# Patient Record
Sex: FEMALE | Race: BLACK OR AFRICAN AMERICAN | NOT HISPANIC OR LATINO | Employment: PART TIME | URBAN - METROPOLITAN AREA
[De-identification: names, ages, dates, MRNs, and addresses within clinical notes are randomized per-mention and may not be internally consistent; named-entity substitution may affect disease eponyms.]

---

## 2017-01-11 ENCOUNTER — OFFICE VISIT (OUTPATIENT)
Dept: OBSTETRICS AND GYNECOLOGY | Facility: CLINIC | Age: 37
End: 2017-01-11
Payer: OTHER GOVERNMENT

## 2017-01-11 VITALS
DIASTOLIC BLOOD PRESSURE: 80 MMHG | HEIGHT: 66 IN | SYSTOLIC BLOOD PRESSURE: 120 MMHG | WEIGHT: 153.69 LBS | BODY MASS INDEX: 24.7 KG/M2

## 2017-01-11 DIAGNOSIS — Z01.419 WELL WOMAN EXAM WITH ROUTINE GYNECOLOGICAL EXAM: Primary | ICD-10-CM

## 2017-01-11 DIAGNOSIS — N89.8 VAGINAL ITCHING: ICD-10-CM

## 2017-01-11 DIAGNOSIS — Z01.419 PAP SMEAR, AS PART OF ROUTINE GYNECOLOGICAL EXAMINATION: ICD-10-CM

## 2017-01-11 PROCEDURE — 99999 PR PBB SHADOW E&M-EST. PATIENT-LVL II: CPT | Mod: PBBFAC,,, | Performed by: OBSTETRICS & GYNECOLOGY

## 2017-01-11 PROCEDURE — 87210 SMEAR WET MOUNT SALINE/INK: CPT | Mod: PBBFAC | Performed by: OBSTETRICS & GYNECOLOGY

## 2017-01-11 PROCEDURE — 99212 OFFICE O/P EST SF 10 MIN: CPT | Mod: PBBFAC | Performed by: OBSTETRICS & GYNECOLOGY

## 2017-01-11 PROCEDURE — 99395 PREV VISIT EST AGE 18-39: CPT | Mod: 25,S$PBB,, | Performed by: OBSTETRICS & GYNECOLOGY

## 2017-01-11 PROCEDURE — 88175 CYTOPATH C/V AUTO FLUID REDO: CPT

## 2017-01-11 NOTE — PROGRESS NOTES
Subjective:       Patient ID: Haley Hammer is a 36 y.o. female.    Chief Complaint:  Annual Exam      History of Present Illness  HPI  Presents for a well-woman exam.  Last pap in  was ASCUS, high risk HPV negative.  Patient and her  use condoms for contraception.  Declines any other method.  She reports occasional vaginal itching.  No discharge.    GYN & OB History  Patient's last menstrual period was 2016.   Date of Last Pap: 2014    OB History    Para Term  AB SAB TAB Ectopic Multiple Living   3 3 3      0 3      # Outcome Date GA Lbr Yannick/2nd Weight Sex Delivery Anes PTL Lv   3 Term 11/10/14 39w1d  3.189 kg (7 lb 0.5 oz) F CS-LTranv Spinal N Y   2 Term 13 40w0d  4.082 kg (9 lb) M CS-Unspec Spinal N Y   1 Term 04/18/10 40w0d  4.536 kg (10 lb) F CS-Unspec Spinal N Y          Review of Systems  Review of Systems   Constitutional: Negative for activity change, fatigue, fever and unexpected weight change.   Gastrointestinal: Negative for abdominal pain, bloating, constipation, diarrhea, nausea and vomiting.   Endocrine: Negative for hair loss and hot flashes.   Genitourinary: Negative for dyspareunia, dysuria, frequency, genital sores, hematuria, menorrhagia, menstrual problem, pelvic pain, urgency, vaginal bleeding, vaginal discharge, vaginal pain (has vaginal itching), dysmenorrhea, postcoital bleeding and vaginal odor.   Skin:  Negative for rash and hair changes.   Hematological: Negative for adenopathy.   Breast: Negative for breast mass, breast pain, nipple discharge and skin changes          Objective:    Physical Exam:   Constitutional: She is oriented to person, place, and time. She appears well-developed and well-nourished. No distress.      Neck: Neck supple. No thyromegaly present.     Pulmonary/Chest: Right breast exhibits no inverted nipple, no mass, no nipple discharge, no skin change, no tenderness, no bleeding and no swelling. Left breast exhibits  no inverted nipple, no mass, no nipple discharge, no skin change, no tenderness, no bleeding and no swelling. Breasts are symmetrical.        Abdominal: Soft. She exhibits no distension and no mass. There is no tenderness. There is no rebound and no guarding.     Genitourinary: Pelvic exam was performed with patient supine. There is no rash, tenderness, lesion or injury on the right labia. There is no rash, tenderness, lesion or injury on the left labia. Uterus is not deviated, not enlarged, not fixed, not tender, not hosting fibroids and not experiencing uterine prolapse. Cervix is normal. Right adnexum displays no mass, no tenderness and no fullness. Left adnexum displays no mass, no tenderness and no fullness. No erythema, tenderness, bleeding, rectocele or cystocele in the vagina. No foreign body in the vagina. No signs of injury around the vagina. No vaginal discharge found. Cervix exhibits no motion tenderness, no discharge and no friability.        Uterus Size: 6 cm      Lymphadenopathy:        Right: No inguinal adenopathy present.        Left: No inguinal adenopathy present.    Neurological: She is alert and oriented to person, place, and time.     Psychiatric: She has a normal mood and affect.        wet prep: normal  Assessment:        1. Well woman exam with routine gynecological exam    2. Pap smear, as part of routine gynecological examination    3. Vaginal itching                Plan:      Haley was seen today for annual exam.    Diagnoses and all orders for this visit:    Well woman exam with routine gynecological exam    Pap smear, as part of routine gynecological examination  -     Liquid-based pap smear, screening    Vaginal itching     Reviewed updated recommendations for pap smears (every 3 years) in low risk patients.   Recommend annual pelvic exams.  Reviewed recommendations for annual CBE.  Patient was counseled today on vaginitis prevention including :  a. avoiding feminine products such  as deoderant soaps, body wash, bubble bath, douches, scented toilet paper, deoderant tampons or pads, feminine wipes, chronic pad use, etc.  b. avoiding other vulvovaginal irritants such as long hot baths, humidity, tight, synthetic clothing, chlorine and sitting around in wet bathing suits  c. wearing cotton underwear, avoiding thong underwear and no underwear to bed  d. taking showers instead of baths and use a hair dryer on cool setting afterwards to dry  e. wearing cotton to exercise and shower immediately after exercise and change clothes  RTC 1 year or sooner prn.

## 2017-04-24 ENCOUNTER — CLINICAL SUPPORT (OUTPATIENT)
Dept: CARDIOLOGY | Facility: CLINIC | Age: 37
End: 2017-04-24
Payer: OTHER GOVERNMENT

## 2017-04-24 ENCOUNTER — OFFICE VISIT (OUTPATIENT)
Dept: INTERNAL MEDICINE | Facility: CLINIC | Age: 37
End: 2017-04-24
Payer: OTHER GOVERNMENT

## 2017-04-24 ENCOUNTER — HOSPITAL ENCOUNTER (OUTPATIENT)
Dept: RADIOLOGY | Facility: HOSPITAL | Age: 37
Discharge: HOME | End: 2017-04-24
Attending: FAMILY MEDICINE
Payer: OTHER GOVERNMENT

## 2017-04-24 VITALS
BODY MASS INDEX: 25.19 KG/M2 | WEIGHT: 156.75 LBS | DIASTOLIC BLOOD PRESSURE: 80 MMHG | SYSTOLIC BLOOD PRESSURE: 138 MMHG | TEMPERATURE: 97 F | HEIGHT: 66 IN | OXYGEN SATURATION: 99 % | HEART RATE: 56 BPM

## 2017-04-24 DIAGNOSIS — R07.89 ATYPICAL CHEST PAIN: ICD-10-CM

## 2017-04-24 DIAGNOSIS — Z13.6 SCREENING FOR CARDIOVASCULAR CONDITION: ICD-10-CM

## 2017-04-24 DIAGNOSIS — M47.817 LUMBOSACRAL SPONDYLOSIS WITHOUT MYELOPATHY: ICD-10-CM

## 2017-04-24 DIAGNOSIS — R10.13 EPIGASTRIC ABDOMINAL PAIN: Primary | ICD-10-CM

## 2017-04-24 DIAGNOSIS — R10.13 EPIGASTRIC ABDOMINAL PAIN: ICD-10-CM

## 2017-04-24 DIAGNOSIS — M79.10 MYALGIA: ICD-10-CM

## 2017-04-24 DIAGNOSIS — D57.3 SICKLE CELL TRAIT: ICD-10-CM

## 2017-04-24 PROCEDURE — 71020 XR CHEST PA AND LATERAL: CPT | Mod: 26,,, | Performed by: RADIOLOGY

## 2017-04-24 PROCEDURE — 93005 ELECTROCARDIOGRAM TRACING: CPT | Mod: PBBFAC,PO | Performed by: NUCLEAR MEDICINE

## 2017-04-24 PROCEDURE — 99214 OFFICE O/P EST MOD 30 MIN: CPT | Mod: S$PBB,,, | Performed by: FAMILY MEDICINE

## 2017-04-24 PROCEDURE — 74020 XR ABDOMEN FLAT AND ERECT: CPT | Mod: TC,PO

## 2017-04-24 PROCEDURE — 74020 XR ABDOMEN FLAT AND ERECT: CPT | Mod: 26,,, | Performed by: RADIOLOGY

## 2017-04-24 PROCEDURE — 93010 ELECTROCARDIOGRAM REPORT: CPT | Mod: S$PBB,,, | Performed by: NUCLEAR MEDICINE

## 2017-04-24 PROCEDURE — 71020 XR CHEST PA AND LATERAL: CPT | Mod: TC,PO

## 2017-04-24 PROCEDURE — 99213 OFFICE O/P EST LOW 20 MIN: CPT | Mod: PBBFAC,PO | Performed by: FAMILY MEDICINE

## 2017-04-24 PROCEDURE — 99999 PR PBB SHADOW E&M-EST. PATIENT-LVL III: CPT | Mod: PBBFAC,,, | Performed by: FAMILY MEDICINE

## 2017-04-24 RX ORDER — OMEPRAZOLE 40 MG/1
40 CAPSULE, DELAYED RELEASE ORAL DAILY
Qty: 30 CAPSULE | Refills: 0 | Status: SHIPPED | OUTPATIENT
Start: 2017-04-24 | End: 2018-09-26

## 2017-04-24 NOTE — MR AVS SNAPSHOT
[facility] Medicine  [address] [facility] [address] [place] [address]  Phone: [phone]  Fax: [phone]                  Haley Hammer   2017 1:20 PM   Office Visit    Description:  Female : 1980   Provider:  Kell Lamar MD   Department:  [facility] - Internal Medicine           Reason for Visit     Abdominal Pain           Diagnoses this Visit        Comments    Epigastric abdominal pain    -  Primary     Myalgia         Atypical chest pain         Lumbosacral spondylosis without myelopathy         Sickle cell trait         Screening for cardiovascular condition                To Do List           Future Appointments        Provider Department Dept Phone    2017 2:[phone]    2017 3:15 PM LAB[facility]ATORY, [facility] [phone]    2017 3:40 PM SPECIMEN, [facility] [phone]    2017 4:00 PM [facility] [phone]      Goals (5 Years of Data)     None      Follow-Up and Disposition     Return if symptoms worsen or fail to improve.       These Medications        Disp Refills Start End    omeprazole (PRILOSEC) 40 MG capsule 30 capsule 0 2017    Take 1 capsule (40 mg total) by mouth once daily. - Oral    Pharmacy: [facility] [address] [place], [place] [address]ON [facility] [address] AT [facility] [place] Ph #: [phone]         [facility]     [facility] Nurse Care Line -  Assistance  Unless otherwise directed by your provider, please contact [facility], our nurse care line that is available for  assistance.     Registered nurses in the [facility] provide: appointment scheduling, clinical advisement, health education, and other advisory services.  Call: [phone] (toll free)               Medications           Message regarding Medications     Verify the changes and/or additions  "to your medication regime listed below are the same as discussed with your clinician today.  If any of these changes or additions are incorrect, please notify your healthcare provider.        START taking these NEW medications        Refills    omeprazole (PRILOSEC) 40 MG capsule 0    Sig: Take 1 capsule (40 mg total) by mouth once daily.    Class: Normal    Route: Oral           Verify that the below list of medications is an accurate representation of the medications you are currently taking.  If none reported, the list may be blank. If incorrect, please contact your healthcare provider. Carry this list with you in case of emergency.           Current Medications     omeprazole (PRILOSEC) 40 MG capsule Take 1 capsule (40 mg total) by mouth once daily.           Clinical Reference Information           Your Vitals Were     BP Pulse Temp Height Weight SpO2    138/80 56 97.2 °F (36.2 °C) (Tympanic) 5' 6" (1.676 m) 71.1 kg (156 lb 12 oz) 99%    BMI                25.3 kg/m2          Blood Pressure          Most Recent Value    BP  138/80      Allergies as of 4/24/2017     No Known Allergies      Immunizations Administered on Date of Encounter - 4/24/2017     None      Orders Placed During Today's Visit      Normal Orders This Visit    X-Ray Chest PA And Lateral     Future Labs/Procedures Expected by Expires    CBC auto differential  4/24/2017 6/23/2018    Comprehensive metabolic panel  4/24/2017 6/23/2018    H. PYLORI ANTIBODY, IGG  4/24/2017 6/23/2018    Lipid panel  4/24/2017 6/23/2018    Troponin I  4/24/2017 6/23/2018    TSH  4/24/2017 6/23/2018    Urinalysis  4/24/2017 6/23/2018    X-Ray Abdomen Flat And Erect  4/24/2017 4/24/2018    EKG 12-lead  As directed 4/24/2018      Language Assistance Services     ATTENTION: Language assistance services are available, free of charge. Please call [phone].      ATENCIÓN: Si habla español, tiene a arevalo disposición servicios gratuitos de asistencia lingüística. Llame al " [phone].     CHRISTOS Ý: N?u b?n nói Ti?ng Vi?t, có các d?ch v? h? tr? ngôn ng? mi?n phí dành cho b?n. G?i s? [phone].         [facility] - Internal [facility] complies with applicable Federal civil rights laws and does not discriminate on the basis of race, color, national origin, age, disability, or sex.

## 2017-04-24 NOTE — PROGRESS NOTES
"Patient is inSubjective:       Patient ID: Haley Hammer is a 36 y.o. female.    Chief Complaint: Abdominal Pain    HPI Comments: 36-year-old female patient with Patient Active Problem List:     Sickle cell trait     Lumbosacral spondylosis without myelopathy  Here with complaint of epigastric abdominal pain, but denies of any nausea vomiting, patient reports having bowel movement once every 2 days, previous bowel movement was 2 days ago.  Patient has been having shoulder muscle pains and right chest pain, off and on lately for the past 3-4 days.  Denies of any fever.  Patient reports feels like acid reflex symptoms but not for sure.   Denies of any shortness of breath  Denies of any significant low back pain at this time  Denies of any changes in urine.         Abdominal Pain   Associated symptoms include constipation and myalgias. Pertinent negatives include no arthralgias, diarrhea, dysuria, frequency, headaches, nausea or vomiting.     Review of Systems   Constitutional: Negative for fatigue.   Eyes: Negative for visual disturbance.   Respiratory: Negative for shortness of breath.    Cardiovascular: Positive for chest pain. Negative for leg swelling.   Gastrointestinal: Positive for abdominal pain and constipation. Negative for diarrhea, nausea and vomiting.   Genitourinary: Negative for dysuria, flank pain, frequency and urgency.   Musculoskeletal: Positive for myalgias. Negative for arthralgias.   Skin: Negative for rash.   Neurological: Negative for light-headedness and headaches.   Psychiatric/Behavioral: Negative for sleep disturbance.         /80  Pulse (!) 56  Temp 97.2 °F (36.2 °C) (Tympanic)   Ht 5' 6" (1.676 m)  Wt 71.1 kg (156 lb 12 oz)  SpO2 99%  Breastfeeding? No  BMI 25.3 kg/m2  Objective:      Physical Exam   Constitutional: She is oriented to person, place, and time. She appears well-developed and well-nourished.   HENT:   Head: Normocephalic and atraumatic.   Mouth/Throat: " Oropharynx is clear and moist.   Cardiovascular: Normal rate, regular rhythm and normal heart sounds.    No murmur heard.  Pulmonary/Chest: Effort normal and breath sounds normal. She has no wheezes. She exhibits no tenderness.   Abdominal: Soft. Bowel sounds are normal. There is tenderness.   Positive for epigastric abdominal tenderness on exam today   Musculoskeletal: She exhibits tenderness. She exhibits no edema.   Positive for bilateral shoulder muscles posteriorly   Neurological: She is alert and oriented to person, place, and time.   Skin: Skin is warm and dry. No rash noted.   Psychiatric: She has a normal mood and affect.         Assessment:       1. Epigastric abdominal pain    2. Myalgia    3. Atypical chest pain    4. Lumbosacral spondylosis without myelopathy    5. Sickle cell trait    6. Screening for cardiovascular condition        Plan:   Epigastric abdominal pain  -     CBC auto differential; Future; Expected date: 4/24/17  -     Comprehensive metabolic panel; Future; Expected date: 4/24/17  -     TSH; Future; Expected date: 4/24/17  -     Urinalysis; Future; Expected date: 4/24/17  -     X-Ray Abdomen Flat And Erect; Future; Expected date: 4/24/17  -     H. PYLORI ANTIBODY, IGG; Future; Expected date: 4/24/17  -     omeprazole (PRILOSEC) 40 MG capsule; Take 1 capsule (40 mg total) by mouth once daily.  Dispense: 30 capsule; Refill: 0  Could be secondary to acid reflex versus organic etiology.  Will check further labs ,omeprazole prescribed today for symptomatic relief as a trial  Patient was advised to drink adequate fluids and eat small frequent meals  Eat fiber rich diet to avoid constipation    if symptoms continue to persist or worsen return to the clinic or go to ER immediately    Myalgia  -     Comprehensive metabolic panel; Future; Expected date: 4/24/17  -     TSH; Future; Expected date: 4/24/17  -     X-Ray Chest PA And Lateral  -     Troponin I; Future; Expected date: 4/24/17    Atypical  chest pain  -     Troponin I; Future; Expected date: 4/24/17  -     EKG 12-lead; Future   likely musculoskeletal, will check further labs and rule out cardiac etiology    Lumbosacral spondylosis without myelopathy-stable     Sickle cell trait-stable     Screening for cardiovascular condition  -     Lipid panel; Future; Expected date: 4/24/17

## 2017-04-26 ENCOUNTER — TELEPHONE (OUTPATIENT)
Dept: INTERNAL MEDICINE | Facility: CLINIC | Age: 37
End: 2017-04-26

## 2017-04-26 DIAGNOSIS — D50.9 IRON DEFICIENCY ANEMIA, UNSPECIFIED IRON DEFICIENCY ANEMIA TYPE: ICD-10-CM

## 2017-04-26 DIAGNOSIS — R76.8 HELICOBACTER PYLORI AB+: Primary | ICD-10-CM

## 2017-04-26 RX ORDER — AMOXICILLIN 500 MG/1
1000 CAPSULE ORAL EVERY 12 HOURS
Qty: 40 CAPSULE | Refills: 0 | Status: SHIPPED | OUTPATIENT
Start: 2017-04-26 | End: 2018-09-26 | Stop reason: ALTCHOICE

## 2017-04-26 RX ORDER — CLARITHROMYCIN 500 MG/1
500 TABLET, FILM COATED ORAL EVERY 12 HOURS
Qty: 20 TABLET | Refills: 0 | Status: SHIPPED | OUTPATIENT
Start: 2017-04-26 | End: 2018-09-26 | Stop reason: ALTCHOICE

## 2017-04-26 RX ORDER — FERROUS SULFATE 325(65) MG
325 TABLET ORAL 2 TIMES DAILY
Qty: 60 TABLET | Refills: 3 | Status: SHIPPED | OUTPATIENT
Start: 2017-04-26 | End: 2019-01-21

## 2017-04-26 NOTE — TELEPHONE ENCOUNTER
Positive for Helicobacter pylori, 2 antibiotics has been sent to pharmacy amoxicillin and clarithromycin, patient to continue omeprazole as prescribed.   X-ray of the abdomen showed no acute findings  Chest x-ray showing  No  Acute changes at this time   EKG showing low heart rate  Also noted mild anemia, iron supplements will be sent to pharmacy

## 2017-10-06 ENCOUNTER — TELEPHONE (OUTPATIENT)
Dept: PHYSICAL MEDICINE AND REHAB | Facility: CLINIC | Age: 37
End: 2017-10-06

## 2017-10-06 NOTE — TELEPHONE ENCOUNTER
----- Message from Gaetano Fraire sent at 10/6/2017 10:12 AM CDT -----  Contact: [phone]  Would like to consult with nurse about medication refill for leg pain. Pt having pain right leg. Please call bk at [phone]. Changing pharmacy please call for info.    thx lj

## 2018-09-26 ENCOUNTER — OFFICE VISIT (OUTPATIENT)
Dept: OBSTETRICS AND GYNECOLOGY | Facility: CLINIC | Age: 38
End: 2018-09-26
Payer: OTHER GOVERNMENT

## 2018-09-26 ENCOUNTER — LAB VISIT (OUTPATIENT)
Dept: LAB | Facility: HOSPITAL | Age: 38
End: 2018-09-26
Attending: NURSE PRACTITIONER
Payer: OTHER GOVERNMENT

## 2018-09-26 VITALS
WEIGHT: 143.06 LBS | SYSTOLIC BLOOD PRESSURE: 140 MMHG | BODY MASS INDEX: 22.99 KG/M2 | HEIGHT: 66 IN | DIASTOLIC BLOOD PRESSURE: 100 MMHG

## 2018-09-26 DIAGNOSIS — Z11.3 SCREENING EXAMINATION FOR STD (SEXUALLY TRANSMITTED DISEASE): Primary | ICD-10-CM

## 2018-09-26 DIAGNOSIS — Z11.3 SCREENING EXAMINATION FOR STD (SEXUALLY TRANSMITTED DISEASE): ICD-10-CM

## 2018-09-26 PROCEDURE — 99213 OFFICE O/P EST LOW 20 MIN: CPT | Mod: PBBFAC | Performed by: NURSE PRACTITIONER

## 2018-09-26 PROCEDURE — 99999 PR PBB SHADOW E&M-EST. PATIENT-LVL III: CPT | Mod: PBBFAC,,, | Performed by: NURSE PRACTITIONER

## 2018-09-26 PROCEDURE — 99213 OFFICE O/P EST LOW 20 MIN: CPT | Mod: S$PBB,,, | Performed by: NURSE PRACTITIONER

## 2018-09-26 PROCEDURE — 87491 CHLMYD TRACH DNA AMP PROBE: CPT

## 2018-09-26 PROCEDURE — 86703 HIV-1/HIV-2 1 RESULT ANTBDY: CPT

## 2018-09-26 PROCEDURE — 36415 COLL VENOUS BLD VENIPUNCTURE: CPT

## 2018-09-26 PROCEDURE — 86592 SYPHILIS TEST NON-TREP QUAL: CPT

## 2018-09-26 PROCEDURE — 87660 TRICHOMONAS VAGIN DIR PROBE: CPT

## 2018-09-26 RX ORDER — TRIAMCINOLONE ACETONIDE 1 MG/G
CREAM TOPICAL 2 TIMES DAILY
Qty: 15 G | Refills: 0 | Status: SHIPPED | OUTPATIENT
Start: 2018-09-26 | End: 2019-01-21

## 2018-09-26 RX ORDER — FLUCONAZOLE 150 MG/1
150 TABLET ORAL DAILY
Qty: 2 TABLET | Refills: 0 | Status: SHIPPED | OUTPATIENT
Start: 2018-09-26 | End: 2018-09-28

## 2018-09-26 NOTE — PROGRESS NOTES
"CC: Possible STD exposure     Haley Hammer is a 37 y.o. female  presents for concerns of STD exposure.  LMP: Patient's last menstrual period was 2018 (exact date)..  Patient reports vaginal itching.     Past Medical History:   Diagnosis Date    Abnormal Pap smear of vagina     Anemia     Back pain in pregnancy     Rh negative state in antepartum period-  Pt is AB - 2014    Sickle cell trait      does not carry sickle cell trait     Past Surgical History:   Procedure Laterality Date     SECTION      x3    DELIVERY-CEASAREAN SECTION N/A 11/10/2014    Performed by Angely Way MD at [facility] L[facility]D     Social History     Socioeconomic History    Marital status:      Spouse name: Not on file    Number of children: Not on file    Years of education: Not on file    Highest education level: Not on file   Social Needs    Financial resource strain: Not on file    Food insecurity - worry: Not on file    Food insecurity - inability: Not on file    Transportation needs - medical: Not on file    Transportation needs - non-medical: Not on file   Occupational History    Not on file   Tobacco Use    Smoking status: Never Smoker    Smokeless tobacco: Never Used   Substance and Sexual Activity    Alcohol use: No    Drug use: No    Sexual activity: Yes     Partners: Male     Birth control/protection: Condom   Other Topics Concern    Are you pregnant or think you may be? No    Breast-feeding No   Social History Narrative    Not on file     Family History   Problem Relation Age of Onset    Breast cancer Neg Hx     Ovarian cancer Neg Hx     Deep vein thrombosis Neg Hx     Colon cancer Neg Hx     Eczema Neg Hx     Lupus Neg Hx     Psoriasis Neg Hx      OB History      Para Term  AB Living    3 3 3     3    SAB TAB Ectopic Multiple Live Births          0 3          BP (!) 140/100   Ht 5' 6" (1.676 m)   Wt 64.9 kg (143 lb 1.3 oz)   LMP 2018 " (Exact Date)   BMI 23.09 kg/m²       ROS:  GENERAL: Denies weight gain or weight loss. Feeling well overall.   CARDIOVASCULAR: Denies palpitations or left sided chest pain.   ABDOMEN: No abdominal pain, constipation, diarrhea, nausea, vomiting or rectal bleeding.   URINARY: No frequency, dysuria, hematuria, or burning on urination.  REPRODUCTIVE: See HPI.       PHYSICAL EXAM:  APPEARANCE: Well nourished, well developed, in no acute distress.  AFFECT: WNL, alert and oriented x 3  PELVIC: Normal external genitalia without lesions. Vagina moist and well rugated without lesions or discharge.  Cervix pink, without lesions, discharge or tenderness.      1. Screening examination for STD (sexually transmitted disease)  HIV-1 and HIV-2 antibodies    RPR    C. trachomatis/N. gonorrhoeae by AMP DNA    Vaginosis Screen by DNA Probe    PLAN:  STD assessment

## 2018-09-27 LAB
C TRACH DNA SPEC QL NAA+PROBE: NOT DETECTED
CANDIDA RRNA VAG QL PROBE: NEGATIVE
G VAGINALIS RRNA GENITAL QL PROBE: POSITIVE
HIV 1+2 AB+HIV1 P24 AG SERPL QL IA: NEGATIVE
N GONORRHOEA DNA SPEC QL NAA+PROBE: NOT DETECTED
RPR SER QL: NORMAL
T VAGINALIS RRNA GENITAL QL PROBE: NEGATIVE

## 2018-09-27 RX ORDER — METRONIDAZOLE 500 MG/1
500 TABLET ORAL 2 TIMES DAILY
Qty: 14 TABLET | Refills: 0 | Status: SHIPPED | OUTPATIENT
Start: 2018-09-27 | End: 2018-10-04

## 2018-10-11 ENCOUNTER — TELEPHONE (OUTPATIENT)
Dept: OBSTETRICS AND GYNECOLOGY | Facility: CLINIC | Age: 38
End: 2018-10-11

## 2018-10-11 NOTE — TELEPHONE ENCOUNTER
----- Message from Love Ricardo sent at 10/11/2018  3:16 PM CDT -----  Contact: pt  The pt states she is returning a missed call, the pt can be reached at [phone]///thxMW

## 2018-10-11 NOTE — TELEPHONE ENCOUNTER
Returned pt call. Advised pt the nurse called by mistake and her appointment is correct. Pt verbalized understanding.

## 2018-10-12 ENCOUNTER — OFFICE VISIT (OUTPATIENT)
Dept: OBSTETRICS AND GYNECOLOGY | Facility: CLINIC | Age: 38
End: 2018-10-12
Payer: OTHER GOVERNMENT

## 2018-10-12 VITALS
WEIGHT: 141.56 LBS | SYSTOLIC BLOOD PRESSURE: 132 MMHG | DIASTOLIC BLOOD PRESSURE: 82 MMHG | HEIGHT: 66 IN | BODY MASS INDEX: 22.75 KG/M2

## 2018-10-12 DIAGNOSIS — Z01.419 WELL WOMAN EXAM WITH ROUTINE GYNECOLOGICAL EXAM: Primary | ICD-10-CM

## 2018-10-12 PROCEDURE — 99395 PREV VISIT EST AGE 18-39: CPT | Mod: S$PBB,,, | Performed by: OBSTETRICS & GYNECOLOGY

## 2018-10-12 PROCEDURE — 99999 PR PBB SHADOW E&M-EST. PATIENT-LVL II: CPT | Mod: PBBFAC,,, | Performed by: OBSTETRICS & GYNECOLOGY

## 2018-10-12 PROCEDURE — 99212 OFFICE O/P EST SF 10 MIN: CPT | Mod: PBBFAC | Performed by: OBSTETRICS & GYNECOLOGY

## 2018-10-12 NOTE — PROGRESS NOTES
Subjective:       Patient ID: Haley aHmmer is a 37 y.o. female.    Chief Complaint:  Well woman exam    History of Present Illness  HPI  Presents for well-woman exam.  No gyn complaints.  Is mutually monogamous with her .  She had negative STD screening 2 weeks ago.  Declines contraception.  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   Last pap 2017: normal    GYN & OB History  Patient's last menstrual period was 2018 (exact date).   Date of Last Pap: 2017    OB History    Para Term  AB Living   3 3 3     3   SAB TAB Ectopic Multiple Live Births         0 3      # Outcome Date GA Lbr Yannick/2nd Weight Sex Delivery Anes PTL Lv   3 Term 11/10/14 39w1d  3.189 kg (7 lb 0.5 oz) F CS-LTranv Spinal N DEBORAH   2 Term 13 40w0d  4.082 kg (9 lb) M CS-Unspec Spinal N DEBORAH   1 Term 04/18/10 40w0d  4.536 kg (10 lb) F CS-Unspec Spinal N DEBORAH          Review of Systems  Review of Systems   Constitutional: Negative for activity change, fatigue, fever and unexpected weight change.   Gastrointestinal: Negative for abdominal pain, bloating, constipation, diarrhea, nausea and vomiting.   Endocrine: Negative for hair loss and hot flashes.   Genitourinary: Negative for dyspareunia, dysuria, frequency, genital sores, hematuria, menorrhagia, menstrual problem, pelvic pain, urgency, vaginal bleeding, vaginal discharge, vaginal pain, dysmenorrhea, postcoital bleeding and vaginal odor.   Skin:  Negative for rash and hair changes.   Hematological: Negative for adenopathy.   Breast: Negative for breast mass, breast pain, nipple discharge and skin changes          Objective:    Physical Exam:   Constitutional: She is oriented to person, place, and time. She appears well-developed and well-nourished. No distress.      Neck: Neck supple. No thyromegaly present.     Pulmonary/Chest: Right breast exhibits no inverted nipple, no mass, no nipple  discharge, no skin change, no tenderness, no bleeding and no swelling. Left breast exhibits no inverted nipple, no mass, no nipple discharge, no skin change, no tenderness, no bleeding and no swelling. Breasts are symmetrical.        Abdominal: Soft. She exhibits no distension and no mass. There is no tenderness. There is no rebound and no guarding.     Genitourinary: Pelvic exam was performed with patient supine. There is no rash, tenderness, lesion or injury on the right labia. There is no rash, tenderness, lesion or injury on the left labia. Uterus is not deviated, not enlarged, not fixed, not tender, not hosting fibroids and not experiencing uterine prolapse. Cervix is normal. Right adnexum displays no mass, no tenderness and no fullness. Left adnexum displays no mass, no tenderness and no fullness. No erythema, tenderness, bleeding, rectocele or cystocele in the vagina. No foreign body in the vagina. No signs of injury around the vagina. No vaginal discharge found. Cervix exhibits no motion tenderness, no discharge and no friability.        Uterus Size: 6 cm      Lymphadenopathy:        Right: No inguinal adenopathy present.        Left: No inguinal adenopathy present.    Neurological: She is alert and oriented to person, place, and time.     Psychiatric: She has a normal mood and affect.          Assessment:        1. Well woman exam with routine gynecological exam                Plan:      Diagnoses and all orders for this visit:    Well woman exam with routine gynecological exam    Pap with HPV co-test due in 2020.  RTC 1 year or sooner prn.

## 2019-01-21 ENCOUNTER — OFFICE VISIT (OUTPATIENT)
Dept: INTERNAL MEDICINE | Facility: CLINIC | Age: 39
End: 2019-01-21
Payer: OTHER GOVERNMENT

## 2019-01-21 ENCOUNTER — TELEPHONE (OUTPATIENT)
Dept: INTERNAL MEDICINE | Facility: CLINIC | Age: 39
End: 2019-01-21

## 2019-01-21 VITALS
HEIGHT: 65 IN | BODY MASS INDEX: 24.24 KG/M2 | WEIGHT: 145.5 LBS | DIASTOLIC BLOOD PRESSURE: 96 MMHG | OXYGEN SATURATION: 99 % | TEMPERATURE: 98 F | HEART RATE: 54 BPM | SYSTOLIC BLOOD PRESSURE: 142 MMHG

## 2019-01-21 DIAGNOSIS — R51.9 ACUTE NONINTRACTABLE HEADACHE, UNSPECIFIED HEADACHE TYPE: Primary | ICD-10-CM

## 2019-01-21 DIAGNOSIS — H65.193 ACUTE MIDDLE EAR EFFUSION, BILATERAL: ICD-10-CM

## 2019-01-21 PROCEDURE — 99214 PR OFFICE/OUTPT VISIT, EST, LEVL IV, 30-39 MIN: ICD-10-PCS | Mod: S$PBB,,, | Performed by: NURSE PRACTITIONER

## 2019-01-21 PROCEDURE — 99999 PR PBB SHADOW E&M-EST. PATIENT-LVL III: CPT | Mod: PBBFAC,,, | Performed by: NURSE PRACTITIONER

## 2019-01-21 PROCEDURE — 99999 PR PBB SHADOW E&M-EST. PATIENT-LVL III: ICD-10-PCS | Mod: PBBFAC,,, | Performed by: NURSE PRACTITIONER

## 2019-01-21 PROCEDURE — 99213 OFFICE O/P EST LOW 20 MIN: CPT | Mod: PBBFAC,PN | Performed by: NURSE PRACTITIONER

## 2019-01-21 PROCEDURE — 99214 OFFICE O/P EST MOD 30 MIN: CPT | Mod: S$PBB,,, | Performed by: NURSE PRACTITIONER

## 2019-01-21 RX ORDER — PREDNISONE 10 MG/1
10 TABLET ORAL DAILY
Qty: 5 TABLET | Refills: 0 | Status: SHIPPED | OUTPATIENT
Start: 2019-01-21 | End: 2019-01-26

## 2019-01-21 RX ORDER — LORATADINE 10 MG/1
10 TABLET ORAL DAILY
Refills: 0 | COMMUNITY
Start: 2019-01-21 | End: 2019-02-04

## 2019-01-21 NOTE — TELEPHONE ENCOUNTER
----- Message from Alba Contreras sent at 1/21/2019 11:23 AM CST -----  Contact: pt  Please cancel pt appt at 11:20 , pt reschedule appt for 1pm.

## 2019-01-21 NOTE — PROGRESS NOTES
Subjective:       Patient ID: Haley Hammer is a 38 y.o. female.    Chief Complaint: Headache    Patient presents with headache that started last Thursday that comes and goes.  Reports pain is frontal.  Denies vision changes, dizziness, nausea or vomiting.  Started school on Thursday.  Has more classes this semester.  Denies any URI symptoms.        Headache    This is a new problem. The current episode started in the past 7 days. The problem has been unchanged. The pain is located in the frontal region. The pain does not radiate. The quality of the pain is described as aching. The pain is at a severity of 7/10. Pertinent negatives include no blurred vision, coughing, dizziness, hearing loss or rhinorrhea. She has tried acetaminophen and NSAIDs for the symptoms. The treatment provided moderate relief.     Review of Systems   Constitutional: Negative for chills and fatigue.   HENT: Negative for congestion, hearing loss, postnasal drip and rhinorrhea.    Eyes: Negative for blurred vision.   Respiratory: Negative for cough and shortness of breath.    Musculoskeletal: Negative for arthralgias and gait problem.   Neurological: Positive for headaches. Negative for dizziness.   Psychiatric/Behavioral: Negative for agitation and confusion.       Objective:      Physical Exam   Constitutional: She is oriented to person, place, and time. Vital signs are normal. She appears well-developed and well-nourished.   HENT:   Head: Normocephalic and atraumatic.   Right Ear: A middle ear effusion is present.   Left Ear: A middle ear effusion is present.   Nose: Mucosal edema present.   Neck: Normal range of motion.   Cardiovascular: Normal rate and regular rhythm.   Pulmonary/Chest: Effort normal and breath sounds normal.   Musculoskeletal: Normal range of motion.   Neurological: She is alert and oriented to person, place, and time.   Psychiatric: She has a normal mood and affect. Her behavior is normal.       Assessment:        1. Acute nonintractable headache, unspecified headache type    2. Acute middle ear effusion, bilateral        Plan:         Acute nonintractable headache, unspecified headache type    Acute middle ear effusion, bilateral  -     predniSONE (DELTASONE) 10 MG tablet; Take 1 tablet (10 mg total) by mouth once daily. for 5 days  Dispense: 5 tablet; Refill: 0  -     loratadine (CLARITIN) 10 mg tablet; Take 1 tablet (10 mg total) by mouth once daily.; Refill: 0        Will treat ear effusion.  Headache could be sinus related.  Will schedule a follow up reassess.  Follow up in 2 weeks.

## 2019-02-04 ENCOUNTER — OFFICE VISIT (OUTPATIENT)
Dept: INTERNAL MEDICINE | Facility: CLINIC | Age: 39
End: 2019-02-04
Payer: OTHER GOVERNMENT

## 2019-02-04 ENCOUNTER — LAB VISIT (OUTPATIENT)
Dept: LAB | Facility: HOSPITAL | Age: 39
End: 2019-02-04
Payer: OTHER GOVERNMENT

## 2019-02-04 VITALS
DIASTOLIC BLOOD PRESSURE: 82 MMHG | HEIGHT: 65 IN | HEART RATE: 77 BPM | TEMPERATURE: 97 F | BODY MASS INDEX: 24.65 KG/M2 | SYSTOLIC BLOOD PRESSURE: 156 MMHG | WEIGHT: 147.94 LBS | OXYGEN SATURATION: 99 %

## 2019-02-04 DIAGNOSIS — R03.0 ELEVATED BLOOD PRESSURE READING: ICD-10-CM

## 2019-02-04 DIAGNOSIS — Z09 FOLLOW UP: Primary | ICD-10-CM

## 2019-02-04 LAB
ALBUMIN SERPL BCP-MCNC: 3.6 G/DL
ALP SERPL-CCNC: 59 U/L
ALT SERPL W/O P-5'-P-CCNC: 21 U/L
ANION GAP SERPL CALC-SCNC: 6 MMOL/L
AST SERPL-CCNC: 22 U/L
BASOPHILS # BLD AUTO: 0.02 K/UL
BASOPHILS NFR BLD: 0.4 %
BILIRUB SERPL-MCNC: 0.5 MG/DL
BUN SERPL-MCNC: 10 MG/DL
CALCIUM SERPL-MCNC: 9.6 MG/DL
CHLORIDE SERPL-SCNC: 103 MMOL/L
CO2 SERPL-SCNC: 30 MMOL/L
CREAT SERPL-MCNC: 0.7 MG/DL
DIFFERENTIAL METHOD: ABNORMAL
EOSINOPHIL # BLD AUTO: 0.1 K/UL
EOSINOPHIL NFR BLD: 1 %
ERYTHROCYTE [DISTWIDTH] IN BLOOD BY AUTOMATED COUNT: 15 %
EST. GFR  (AFRICAN AMERICAN): >60 ML/MIN/1.73 M^2
EST. GFR  (NON AFRICAN AMERICAN): >60 ML/MIN/1.73 M^2
GLUCOSE SERPL-MCNC: 74 MG/DL
HCT VFR BLD AUTO: 32.5 %
HGB BLD-MCNC: 10.5 G/DL
IMM GRANULOCYTES # BLD AUTO: 0.01 K/UL
IMM GRANULOCYTES NFR BLD AUTO: 0.2 %
LYMPHOCYTES # BLD AUTO: 2 K/UL
LYMPHOCYTES NFR BLD: 40.9 %
MCH RBC QN AUTO: 26.1 PG
MCHC RBC AUTO-ENTMCNC: 32.3 G/DL
MCV RBC AUTO: 81 FL
MONOCYTES # BLD AUTO: 0.3 K/UL
MONOCYTES NFR BLD: 6.3 %
NEUTROPHILS # BLD AUTO: 2.5 K/UL
NEUTROPHILS NFR BLD: 51.2 %
NRBC BLD-RTO: 0 /100 WBC
PLATELET # BLD AUTO: 282 K/UL
PMV BLD AUTO: 11 FL
POTASSIUM SERPL-SCNC: 3.4 MMOL/L
PROT SERPL-MCNC: 7.3 G/DL
RBC # BLD AUTO: 4.02 M/UL
SODIUM SERPL-SCNC: 139 MMOL/L
TSH SERPL DL<=0.005 MIU/L-ACNC: 0.7 UIU/ML
WBC # BLD AUTO: 4.96 K/UL

## 2019-02-04 PROCEDURE — 99213 PR OFFICE/OUTPT VISIT, EST, LEVL III, 20-29 MIN: ICD-10-PCS | Mod: S$PBB,,, | Performed by: NURSE PRACTITIONER

## 2019-02-04 PROCEDURE — 85025 COMPLETE CBC W/AUTO DIFF WBC: CPT

## 2019-02-04 PROCEDURE — 84443 ASSAY THYROID STIM HORMONE: CPT

## 2019-02-04 PROCEDURE — 36415 COLL VENOUS BLD VENIPUNCTURE: CPT

## 2019-02-04 PROCEDURE — 99213 OFFICE O/P EST LOW 20 MIN: CPT | Mod: S$PBB,,, | Performed by: NURSE PRACTITIONER

## 2019-02-04 PROCEDURE — 80053 COMPREHEN METABOLIC PANEL: CPT

## 2019-02-04 PROCEDURE — 99999 PR PBB SHADOW E&M-EST. PATIENT-LVL III: CPT | Mod: PBBFAC,,, | Performed by: NURSE PRACTITIONER

## 2019-02-04 PROCEDURE — 99213 OFFICE O/P EST LOW 20 MIN: CPT | Mod: PBBFAC,PN | Performed by: NURSE PRACTITIONER

## 2019-02-04 PROCEDURE — 99999 PR PBB SHADOW E&M-EST. PATIENT-LVL III: ICD-10-PCS | Mod: PBBFAC,,, | Performed by: NURSE PRACTITIONER

## 2019-02-04 NOTE — PROGRESS NOTES
Subjective:       Patient ID: Haley Hammer is a 38 y.o. female.    Chief Complaint: Follow-up    Patient present for a follow up appointment.   Had office visit about 2 weeks ago for headache.  Reports headaches are better.  Blood pressure is elevated.  Reports she has not been sleeping.  In school.  Has 3 kids.   [place].  Not sure of family history of hypertension.        Review of Systems   Constitutional: Negative for chills and fatigue.   Respiratory: Negative for cough and shortness of breath.    Musculoskeletal: Negative for arthralgias and gait problem.   Skin: Negative for color change and wound.   Neurological: Negative for dizziness and headaches.   Psychiatric/Behavioral: Negative for agitation and confusion.       Objective:      Physical Exam   Constitutional: She is oriented to person, place, and time. Vital signs are normal. She appears well-developed and well-nourished.   HENT:   Head: Normocephalic and atraumatic.   Neck: Normal range of motion.   Cardiovascular: Normal rate and regular rhythm.   Pulmonary/Chest: Effort normal and breath sounds normal.   Musculoskeletal: Normal range of motion.   Neurological: She is alert and oriented to person, place, and time.   Skin: Skin is warm.   Psychiatric: She has a normal mood and affect. Her behavior is normal.       Assessment:       1. Follow up    2. Elevated blood pressure reading        Plan:         Follow up    Elevated blood pressure reading  -     Comprehensive metabolic panel; Future; Expected date: 02/04/2019  -     CBC auto differential; Future; Expected date: 02/04/2019  -     TSH; Future; Expected date: 02/04/2019        Labs today.  Will have patient follow up in 2 weeks for blood pressure check.  If elevated, will start on anti-hypertensive medication.

## 2019-02-18 ENCOUNTER — TELEPHONE (OUTPATIENT)
Dept: URGENT CARE | Facility: CLINIC | Age: 39
End: 2019-02-18

## 2019-02-18 ENCOUNTER — CLINICAL SUPPORT (OUTPATIENT)
Dept: INTERNAL MEDICINE | Facility: CLINIC | Age: 39
End: 2019-02-18
Payer: OTHER GOVERNMENT

## 2019-02-18 VITALS
DIASTOLIC BLOOD PRESSURE: 100 MMHG | HEART RATE: 74 BPM | TEMPERATURE: 97 F | HEIGHT: 65 IN | SYSTOLIC BLOOD PRESSURE: 142 MMHG | BODY MASS INDEX: 24.5 KG/M2 | OXYGEN SATURATION: 96 % | WEIGHT: 147.06 LBS

## 2019-02-18 DIAGNOSIS — I10 ESSENTIAL HYPERTENSION: Primary | ICD-10-CM

## 2019-02-18 PROCEDURE — 99999 PR PBB SHADOW E&M-EST. PATIENT-LVL III: CPT | Mod: PBBFAC,,,

## 2019-02-18 PROCEDURE — 99213 OFFICE O/P EST LOW 20 MIN: CPT | Mod: PBBFAC,PN

## 2019-02-18 PROCEDURE — 99999 PR PBB SHADOW E&M-EST. PATIENT-LVL III: ICD-10-PCS | Mod: PBBFAC,,,

## 2019-02-18 PROCEDURE — 99499 UNLISTED E&M SERVICE: CPT | Mod: S$PBB,,, | Performed by: FAMILY MEDICINE

## 2019-02-18 PROCEDURE — 99499 NO LOS: ICD-10-PCS | Mod: S$PBB,,, | Performed by: FAMILY MEDICINE

## 2019-02-18 RX ORDER — HYDROCHLOROTHIAZIDE 12.5 MG/1
12.5 TABLET ORAL DAILY
Qty: 30 TABLET | Refills: 5 | Status: SHIPPED | OUTPATIENT
Start: 2019-02-18 | End: 2020-12-16

## 2019-02-18 NOTE — PROGRESS NOTES
Subjective:       Patient ID: Haley Hammer is a 38 y.o. female.    Chief Complaint: Hypertension    Patient returned for blood pressure check.  Blood pressure still elevated.  Will start HCTZ 12.5 mg.  Recheck BMP and blood pressure in a few weeks.        Review of Systems    Objective:      Physical Exam    Assessment:       1. Essential hypertension        Plan:         Essential hypertension  -     hydroCHLOROthiazide (HYDRODIURIL) 12.5 MG Tab; Take 1 tablet (12.5 mg total) by mouth once daily.  Dispense: 30 tablet; Refill: 5  -     Basic metabolic panel; Future; Expected date: 03/11/2019

## 2019-02-18 NOTE — TELEPHONE ENCOUNTER
----- Message from Tena Champagne sent at 2/18/2019  9:13 AM CST -----  Contact: self  Type:  Needs Medical Advice    Who Called: pt  Symptoms (please be specific): n/a   How long has patient had these symptoms:  n/a  Pharmacy name and phone #:n/a  Would the patient rather a call back or a response via [facility]? Call back  Best Call Back Number: [phone]  Additional Information: pt will be twenty minutes late for appt.      Thanks,  Tena Champagne

## 2019-10-15 ENCOUNTER — INITIAL CONSULT (OUTPATIENT)
Dept: PHYSICAL MEDICINE AND REHAB | Facility: CLINIC | Age: 39
End: 2019-10-15
Payer: OTHER GOVERNMENT

## 2019-10-15 ENCOUNTER — OFFICE VISIT (OUTPATIENT)
Dept: OBSTETRICS AND GYNECOLOGY | Facility: CLINIC | Age: 39
End: 2019-10-15
Payer: OTHER GOVERNMENT

## 2019-10-15 VITALS
BODY MASS INDEX: 25.16 KG/M2 | RESPIRATION RATE: 14 BRPM | SYSTOLIC BLOOD PRESSURE: 159 MMHG | WEIGHT: 151 LBS | HEART RATE: 56 BPM | DIASTOLIC BLOOD PRESSURE: 88 MMHG | HEIGHT: 65 IN

## 2019-10-15 VITALS
BODY MASS INDEX: 25.2 KG/M2 | SYSTOLIC BLOOD PRESSURE: 154 MMHG | WEIGHT: 151.25 LBS | DIASTOLIC BLOOD PRESSURE: 102 MMHG | HEIGHT: 65 IN

## 2019-10-15 DIAGNOSIS — M47.816 SPONDYLOSIS OF LUMBAR REGION WITHOUT MYELOPATHY OR RADICULOPATHY: Primary | ICD-10-CM

## 2019-10-15 DIAGNOSIS — Z01.419 WELL WOMAN EXAM WITH ROUTINE GYNECOLOGICAL EXAM: Primary | ICD-10-CM

## 2019-10-15 DIAGNOSIS — M46.1 SACROILIITIS, NOT ELSEWHERE CLASSIFIED: ICD-10-CM

## 2019-10-15 DIAGNOSIS — G89.29 CHRONIC MIDLINE LOW BACK PAIN WITHOUT SCIATICA: ICD-10-CM

## 2019-10-15 DIAGNOSIS — M54.50 CHRONIC MIDLINE LOW BACK PAIN WITHOUT SCIATICA: ICD-10-CM

## 2019-10-15 PROCEDURE — 99999 PR PBB SHADOW E&M-EST. PATIENT-LVL III: ICD-10-PCS | Mod: PBBFAC,,, | Performed by: PHYSICAL MEDICINE & REHABILITATION

## 2019-10-15 PROCEDURE — 99999 PR PBB SHADOW E&M-EST. PATIENT-LVL III: CPT | Mod: PBBFAC,,, | Performed by: PHYSICAL MEDICINE & REHABILITATION

## 2019-10-15 PROCEDURE — 99395 PREV VISIT EST AGE 18-39: CPT | Mod: S$PBB,,, | Performed by: OBSTETRICS & GYNECOLOGY

## 2019-10-15 PROCEDURE — 99999 PR PBB SHADOW E&M-EST. PATIENT-LVL III: ICD-10-PCS | Mod: PBBFAC,,, | Performed by: OBSTETRICS & GYNECOLOGY

## 2019-10-15 PROCEDURE — 99395 PR PREVENTIVE VISIT,EST,18-39: ICD-10-PCS | Mod: S$PBB,,, | Performed by: OBSTETRICS & GYNECOLOGY

## 2019-10-15 PROCEDURE — 99213 OFFICE O/P EST LOW 20 MIN: CPT | Mod: PBBFAC | Performed by: OBSTETRICS & GYNECOLOGY

## 2019-10-15 PROCEDURE — 99213 OFFICE O/P EST LOW 20 MIN: CPT | Mod: PBBFAC,27 | Performed by: PHYSICAL MEDICINE & REHABILITATION

## 2019-10-15 PROCEDURE — 99244 PR OFFICE CONSULTATION,LEVEL IV: ICD-10-PCS | Mod: S$PBB,,, | Performed by: PHYSICAL MEDICINE & REHABILITATION

## 2019-10-15 PROCEDURE — 99999 PR PBB SHADOW E&M-EST. PATIENT-LVL III: CPT | Mod: PBBFAC,,, | Performed by: OBSTETRICS & GYNECOLOGY

## 2019-10-15 PROCEDURE — 99244 OFF/OP CNSLTJ NEW/EST MOD 40: CPT | Mod: S$PBB,,, | Performed by: PHYSICAL MEDICINE & REHABILITATION

## 2019-10-15 RX ORDER — KETOROLAC TROMETHAMINE 10 MG/1
10 TABLET, FILM COATED ORAL 2 TIMES DAILY
Qty: 10 TABLET | Refills: 0 | Status: SHIPPED | OUTPATIENT
Start: 2019-10-15 | End: 2019-10-20

## 2019-10-15 NOTE — PROGRESS NOTES
Subjective:       Patient ID: Haley Hammer is a 38 y.o. female.    Chief Complaint:  Well Woman      History of Present Illness  HPI  Presents for well-woman exam.  No gyn complaints.  She does report low back pain since the birth of her third child 4 years ago.  She saw Dr. Guardado for this in .  She then moved to [place], and did PT there.  Still has the pain.  Has regular, monthly periods.  Mutually monogamous with her .  Uses condoms for contraception.  Pt is now in school for mechanical engineering.  Last pap: 2017: normal    GYN & OB History  Patient's last menstrual period was 10/08/2019.   Date of Last Pap: 2017    OB History    Para Term  AB Living   3 3 3     3   SAB TAB Ectopic Multiple Live Births         0 3      # Outcome Date GA Lbr Yannick/2nd Weight Sex Delivery Anes PTL Lv   3 Term 11/10/14 39w1d  3.189 kg (7 lb 0.5 oz) F CS-LTranv Spinal N DEBORAH   2 Term 13 40w0d  4.082 kg (9 lb) M CS-Unspec Spinal N DEBORAH   1 Term 04/18/10 40w0d  4.536 kg (10 lb) F CS-Unspec Spinal N DEBORAH       Review of Systems  Review of Systems   Constitutional: Negative for activity change, fatigue, fever and unexpected weight change.   Gastrointestinal: Negative for abdominal pain, bloating, constipation, diarrhea, nausea and vomiting.   Endocrine: Negative for hair loss and hot flashes.   Genitourinary: Negative for dysmenorrhea, dyspareunia, dysuria, frequency, genital sores, hematuria, menorrhagia, menstrual problem, pelvic pain, urgency, vaginal bleeding, vaginal discharge, vaginal pain, postcoital bleeding and vaginal odor.   Musculoskeletal: Positive for back pain (see HPI).   Integumentary:  Negative for rash, hair changes, breast mass, nipple discharge and breast skin changes.   Hematological: Negative for adenopathy.   Breast: Negative for mass, mastodynia, nipple discharge and skin changes          Objective:    Physical Exam:   Constitutional: She is oriented to person,  place, and time. She appears well-developed and well-nourished. No distress.      Neck: Neck supple. No thyromegaly present.     Pulmonary/Chest: Right breast exhibits no inverted nipple, no mass, no nipple discharge, no skin change, no tenderness, no bleeding and no swelling. Left breast exhibits no inverted nipple, no mass, no nipple discharge, no skin change, no tenderness, no bleeding and no swelling. Breasts are symmetrical.        Abdominal: Soft. She exhibits no distension and no mass. There is no tenderness. There is no rebound and no guarding.     Genitourinary: Pelvic exam was performed with patient supine. There is no rash, tenderness, lesion or injury on the right labia. There is no rash, tenderness, lesion or injury on the left labia. Uterus is not deviated, not enlarged, not fixed, not tender, not hosting fibroids and not experiencing uterine prolapse. Cervix is normal. Right adnexum displays no mass, no tenderness and no fullness. Left adnexum displays no mass, no tenderness and no fullness. No erythema, tenderness, bleeding, rectocele or cystocele in the vagina. No foreign body in the vagina. No signs of injury around the vagina. No vaginal discharge found. Cervix exhibits no motion tenderness, no discharge and no friability.        Uterus Size: 6 cm   Musculoskeletal:   Mild tenderness along paraspinous muscles of the lumbar region      Lymphadenopathy:        Right: No inguinal adenopathy present.        Left: No inguinal adenopathy present.    Neurological: She is alert and oriented to person, place, and time.     Psychiatric: She has a normal mood and affect.          Assessment:        1. Well woman exam with routine gynecological exam    2. Chronic midline low back pain without sciatica                Plan:      Haley was seen today for well woman.    Diagnoses and all orders for this visit:    Well woman exam with routine gynecological exam    Chronic midline low back pain without  sciatica  -     Ambulatory Referral to Physical Medicine Rehab    Reviewed updated recommendations for pap smears (every 3 years) in low risk patients.   Recommend annual pelvic exams.  Reviewed recommendations for annual CBE.  Next pap due in 2020.  Will refer back to Dr. Guardado for back pain.

## 2019-10-15 NOTE — PATIENT INSTRUCTIONS
Sacroiliitis  The sacrum is the triangle-shaped bone at the base of the spine. It is linked to the other pelvis bones by the sacroiliac joints, also called SI joints. Sacroiliitis is when one or both SI joints are hurt or inflamed. It can make small movements of the lower back and pelvis very painful.        This condition has been linked to other diseases. They include ankylosing spondylitis, rheumatoid arthritis, psoriasis, and Crohns disease or colitis. Symptoms may include pain or stiffness in the hips, lower back, thighs, or buttocks. Pain occurs most often in the morning or after sitting for long periods of time. The pain may get worse when you walk. The swinging motion of the hips strains the SI joints.  Sacroiliitis is caused by many factors such as:  · Heavy lifting, especially if not done the right way  · Severe injury, such as a fall or car accident  · Osteoarthritis  · Pregnancy  · Infection of the joint  This condition is hard to diagnose. It may be confused with other causes of low back pain. To confirm the diagnosis, you may be given a shot of numbing medicine in the SI joint. Treatment includes rest, physical therapy, and anti-inflammatory medicines. If another health problem is the cause, then that must also be treated. More testing may be needed if your symptoms dont get better.  Home care  · If your healthcare provider has prescribed medicines, take all of them as directed.  · You may use over-the-counter pain medicine to control pain, unless another medicine was prescribed. If prednisone was prescribed, dont use NSAIDs, or nonsteroidal anti-inflammatory drugs, such as ibuprofen or naproxen. Talk with your provider before using this medicine if you have chronic liver or kidney disease or ever had a stomach ulcer or GI bleeding.  · If you were referred to physical therapy, make an appointment. Be sure to do any prescribed exercises.  · Dont smoke. Smoking reduces blood flow to the inflamed  area. This makes it harder to treat.  Follow-up care  Follow up with your healthcare provider, or as advised.  If you had an X-ray or an MRI, you will be notified of any new findings that may affect your care.  When to seek medical advice  Contact your healthcare provider right away if any of these occur:  · Increasing low back pain  · Inflammation of the eyes  · Skin rash or redness  · Weakness or numbness in one or both legs  · Loss of bowel or bladder control  · Numbness in the groin area  Date Last Reviewed: 11/24/2015 © 2000-2017 [employer]. [address] [place] [address]. All rights reserved. This information is not intended as a substitute for professional medical care. Always follow your healthcare professional's instructions.        Exercises to Strengthen Your Lower Back  Strong lower back and abdominal muscles work together to support your spine. The exercises below will help strengthen the lower back. It is important that you begin exercising slowly and increase levels gradually.  Always begin any exercise program with stretching. If you feel pain while doing any of these exercises, stop and talk to your doctor about a more specific exercise program that better suits your condition.   Low back stretch  The point of stretching is to make you more flexible and increase your range of motion. Stretch only as much as you are able. Stretch slowly. Do not push your stretch to the limit. If at any point you feel pain while stretching, this is your (temporary) limit.  · Lie on your back with your knees bent and both feet on the ground.  · Slowly raise your left knee to your chest as you flatten your lower back against the floor. Hold for 5 seconds.  · Relax and repeat the exercise with your right knee.  · Do 10 of these exercises for each leg.  · Repeat hugging both knees to your chest at the same time.  Building lower back strength  Start your exercise routine with 10 to 30 minutes a  day, 1 to 3 times a day.  Initial exercises  Lying on your back:  1. Ankle pumps: Move your foot up and down, towards your head, and then away. Repeat 10 times with each foot.  2. Heel slides: Slowly bend your knee, drawing the heel of your foot towards you. Then slide your heel/foot from you, straightening your knee. Do not lift your foot off the floor (this is not a leg lift).  3. Abdominal contraction: Bend your knees and put your hands on your stomach. Tighten your stomach muscles. Hold for 5 seconds, then relax. Repeat 10 times.  4. Straight leg raise: Bend one leg at the knee and keep the other leg straight. Tighten your stomach muscles. Slowly lift your straight leg 6 to 12 inches off the floor and hold for up to 5 seconds. Repeat 10 times on each side.  Standin. Wall squats: Stand with your back against the wall. Move your feet about 12 inches away from the wall. Tighten your stomach muscles, and slowly bend your knees until they are at about a 45 degree angle. Do not go down too far. Hold about 5 seconds. Then slowly return to your starting position. Repeat 10 times.  2. Heel raises: Stand facing the wall. Slowly raise the heels of your feet up and down, while keeping your toes on the floor. If you have trouble balancing, you can touch the wall with your hands. Repeat 10 times.  More advanced exercises  When you feel comfortable enough, try these exercises.  1. Kneeling lumbar extension: Begin on your hands and knees. At the same time, raise and straighten your right arm and left leg until they are parallel to the ground. Hold for 2 seconds and come back slowly to a starting position. Repeat with left arm and right leg, alternating 10 times.  2. Prone lumbar extension: Lie face down, arms extended overhead, palms on the floor. At the same time, raise your right arm and left leg as high as comfortably possible. Hold for 10 seconds and slowly return to start. Repeat with left arm and right leg,  alternating 10 times. Gradually build up to 20 times. (Advanced: Repeat this exercise raising both arms and both legs a few inches off the floor at the same time. Hold for 5 seconds and release.)  3. Pelvic tilt: Lie on the floor on your back with your knees bent at 90 degrees. Your feet should be flat on the floor. Inhale, exhale, then slowly contract your abdominal muscles bringing your navel toward your spine. Let your pelvis rock back until your lower back is flat on the floor. Hold for 10 seconds while breathing smoothly.  4. Abdominal crunch: Perform a pelvic tilt (above) flattening your lower back against the floor. Holding the tension in your abdominal muscles, take another breath and raise your shoulder blades off the ground (this is not a full sit-up). Keep your head in line with your body (dont bend your neck forward). Hold for 2 seconds, then slowly lower.  Date Last Reviewed: 6/1/2016  © 6844-5786 [employer]. [address] [place] [address]. All rights reserved. This information is not intended as a substitute for professional medical care. Always follow your healthcare professional's instructions.        Back Exercises: Abdominal Lift Brace with Marching    The abdominal lift brace with march strengthens your lower abdominal muscles, helping you keep your pelvis and back stable:  · Lie on the floor with both knees bent. Put your feet flat on the floor and your arms by your sides. Tighten your abdominal muscles. Be sure to continue to breathe.  · Lift one bent knee about 2 inches then return it to the floor and lift the other about 2 inches. Keep your abdominal muscles tight and continue to breathe. These motions should be slow and controlled without your pelvis rocking side to side.  · Repeat 10 times.  Date Last Reviewed: 8/16/2015  © 3810-6834 [employer]. [address] [place] [address]. All rights reserved. This information is not intended as a  substitute for professional medical care. Always follow your healthcare professional's instructions.        Back Exercises: Back Release  Do this exercise on your hands and knees. Keep your knees under your hips and your hands under your shoulders.      · Relax your abdominal and buttocks muscles, lift your head, and let your back sag. Be sure to keep your weight evenly distributed. Dont sit back on your hips.   · Hold for 5 seconds.  · Return to starting position.  · Tuck your head and lift (arch) your back.  · Hold for 5 seconds  · Return to starting position.  · Repeat 5 times.  Date Last Reviewed: 8/16/2015  © 2366-1645 [employer]. [address] [place] [address]. All rights reserved. This information is not intended as a substitute for professional medical care. Always follow your healthcare professional's instructions.        Back Exercises: Back Extension with Elbow Press    To start, lie face down on your stomach, feet slightly apart, forehead on the floor. Breathe deeply. You should feel comfortable and relaxed in this position.  · Press up on your forearms. Keep your stomach and hips on the floor. Stay within your painfree range.  · Hold for 20 seconds. Lower slowly.  · Repeat 2 times.  · Return to starting position.  Date Last Reviewed: 10/13/2015  © 5435-2396 [employer]. [address] [place] [address]. All rights reserved. This information is not intended as a substitute for professional medical care. Always follow your healthcare professional's instructions.        Back Exercises: Hip Rotator Stretch    To start, lie on your back with your knees bent and feet flat on the floor. Dont press your neck or lower back to the floor. Breathe deeply. You should feel comfortable and relaxed in this position.  · Rest your right ankle on your left knee.  · Place a towel behind your left thigh, and use it to pull the knee toward your chest. Feel the stretch in  your buttocks.  · Hold for 30 to 60 seconds. Release.  · Repeat 2 times.  · Switch legs.   · If there is any pain other than stretch in the knee or buttock, stop and contact your healthcare provider.  For your safety, check with your healthcare provider before starting an exercise program.   Date Last Reviewed: 8/16/2015 © 2000-2017 [employer]. [address] [place] [address]. All rights reserved. This information is not intended as a substitute for professional medical care. Always follow your healthcare professional's instructions.        Back Exercises: Knee Lift         To start, lie on your back with your knees bent and feet flat on the floor. Dont press your neck or lower back to the floor. Breathe deeply. You should feel comfortable and relaxed in this position:  · Start by tightening your abdominal muscles.  · Lift one bent knee off the floor 2 to 4 inches.  · Hold for 10 seconds. Return to start position.  · Repeat 3 times.  · Switch legs.  Date Last Reviewed: 8/16/2015 © 2000-2017 [employer]. [address] [place] [address]. All rights reserved. This information is not intended as a substitute for professional medical care. Always follow your healthcare professional's instructions.        Back Exercises: Leg Pull    To start, lie on your back with your knees bent and feet flat on the floor. Dont press your neck or lower back to the floor. Breathe deeply. You should feel comfortable and relaxed in this position.  · Pull one knee to your chest.  · Hold for 30 to 60 seconds. Return to starting position.  · Repeat 2 times.  · Switch legs.  · For a double leg pull, pull both legs to your chest at the same time. Repeat 2 times.  For your safety, check with your healthcare provider before starting an exercise program.   Date Last Reviewed: 8/16/2015 © 2000-2017 [employer]. [address] [place] [address]. All rights reserved. This  information is not intended as a substitute for professional medical care. Always follow your healthcare professional's instructions.        Back Exercises: Lower Back Rotation    To start, lie on your back with your knees bent and feet flat on the floor. Dont press your neck or lower back to the floor. Breathe deeply. You should feel comfortable and relaxed in this position.  · Drop both knees to one side. Turn your head to the other side. Keep your shoulders flat on the floor.  · Do not push through pain.  · Hold for 20 seconds.  · Slowly switch sides.  · Repeat 2 to 5 times.  Date Last Reviewed: 10/11/2015  © 9239-3249 [employer]. [address] [place] [address]. All rights reserved. This information is not intended as a substitute for professional medical care. Always follow your healthcare professional's instructions.        Back Exercises: Lower Back Stretch    To start, sit in a chair with your feet flat on the floor. Shift your weight slightly forward. Relax, and keep your ears, shoulders, and hips aligned.  · Sit with your feet well apart.  · Bend forward and touch the floor with the backs of your hands. Relax and let your body drop.  · Hold for 20 seconds. Return to starting position.  · Repeat 2 times.   Date Last Reviewed: 8/16/2015  © 2215-7507 [employer]. [address] [place] [address]. All rights reserved. This information is not intended as a substitute for professional medical care. Always follow your healthcare professional's instructions.        Back Exercises: Seated Rotation    To start, sit in a chair with your feet flat on the floor. Shift your weight slightly forward to avoid rounding your back. Relax, and keep your ears, shoulders, and hips aligned:  · Fold your arms and elbows just below shoulder height.  · Turn from the waist with hips forward. Turn your head last. Do not push through the pain.  · Hold for a count of 10 to 30. Return to  starting position.  · Repeat 3 to 5 times on one side. Then switch sides.  Date Last Reviewed: 10/11/2015  © 2587-8591 [employer]. [address] [place] [address]. All rights reserved. This information is not intended as a substitute for professional medical care. Always follow your healthcare professional's instructions.        Lumbar Flexion (Flexibility)    1. Lie on your back on the floor, with your knees bent and your feet flat on the floor.  2. Gently pull your knees up toward your chest. Put your hands under your thighs to help pull your knees up.  3. Press your lower back down to the floor. Hold for 20 seconds.  4. Lower your legs back down to the floor and relax.  5. Repeat 2 times, or as instructed.  Date Last Reviewed: 3/10/2016  © 5939-6614 [employer]. [address] [place] [address]. All rights reserved. This information is not intended as a substitute for professional medical care. Always follow your healthcare professional's instructions.        Back Care Tips    Caring for your back  These are things you can do to prevent a recurrence of acute back pain and to reduce symptoms from chronic back pain:  · Maintain a healthy weight. If you are overweight, losing weight will help most types of back pain.  · Exercise is an important part of recovery from most types of back pain. The muscles behind and in front of the spine support the back. This means strengthening both the back muscles and the abdominal muscles will provide better support for your spine.   · Swimming and brisk walking are good overall exercises to improve your fitness level.  · Practice safe lifting methods (below).  · Practice good posture when sitting, standing and walking. Avoid prolonged sitting. This puts more stress on the lower back than standing or walking.  · Wear quality shoes with sufficient arch support. Foot and ankle alignment can affect back symptoms. Women should avoid  wearing high heels.  · Therapeutic massage can help relax the back muscles without stretching them.  · During the first 24 to 72 hours after an acute injury or flare-up of chronic back pain, apply an ice pack to the painful area for 20 minutes and then remove it for 20 minutes, over a period of 60 to 90 minutes, or several times a day. As a safety precaution, do not use a heating pad at bedtime. Sleeping on a heating pad can lead to skin burns or tissue damage.  · You can alternate ice and heat therapies.  Medications  Talk to your healthcare provider before using medicines, especially if you have other medical problems or are taking other medicines.  · You may use acetaminophen or ibuprofen to control pain, unless your healthcare provider prescribed other pain medicine. If you have chronic conditions like diabetes, liver or kidney disease, stomach ulcers, or gastrointestinal bleeding, or are taking blood thinners, talk with your healthcare provider before taking any medicines.  · Be careful if you are given prescription pain medicines, narcotics, or medicine for muscle spasm. They can cause drowsiness, affect your coordination, reflexes, and judgment. Do not drive or operate heavy machinery while taking these types of medicines. Take prescription pain medicine only as prescribed by your healthcare provider.  Lumbar stretch  Here is a simple stretching exercise that will help relax muscle spasm and keep your back more limber. If exercise makes your back pain worse, dont do it.  · Lie on your back with your knees bent and both feet on the ground.  · Slowly raise your left knee to your chest as you flatten your lower back against the floor. Hold for 5 seconds.  · Relax and repeat the exercise with your right knee.  · Do 10 of these exercises for each leg.  Safe lifting method  · Dont bend over at the waist to lift an object off the floor.  Instead, bend your knees and hips in a squat.   · Keep your back and head  upright  · Hold the object close to your body, directly in front of you.  · Straighten your legs to lift the object.   · Lower the object to the floor in the reverse fashion.  · If you must slide something across the floor, push it.  Posture tips  Sitting  Sit in chairs with straight backs or low-back support. Keep your knees lower than your hips, with your feet flat on the floor.  When driving, sit up straight. Adjust the seat forward so you are not leaning toward the steering wheel.  A small pillow or rolled towel behind your lower back may help if you are driving long distances.   Standing  When standing for long periods, shift most of your weight to one leg at a time. Alternate legs every few minutes.   Sleeping  The best way to sleep is on your side with your knees bent. Put a low pillow under your head to support your neck in a neutral spine position. Avoid thick pillows that bend your neck to one side. Put a pillow between your legs to further relax your lower back. If you sleep on your back, put pillows under your knees to support your legs in a slightly flexed position. Use a firm mattress. If your mattress sags, replace it, or use a 1/2-inch plywood board under the mattress to add support.  Follow-up care  Follow up with your healthcare provider, or as advised.  If X-rays, a CT scan or an MRI scan were taken, they will be reviewed by a radiologist. You will be notified of any new findings that may affect your care.  Call 911  Seek emergency medical care if any of the following occur:  · Trouble breathing  · Confusion  · Very drowsy  · Fainting or loss of consciousness  · Rapid or very slow heart rate  · Loss of  bowel or bladder control  When to seek medical care  Call your healthcare provider if any of the following occur:  · Pain becomes worse or spreads to your arms or legs  · Weakness or numbness in one or both arms or legs  · Numbness in the groin area  Date Last Reviewed: 6/1/2016  © 9502-9869 [employer]. [address] [place] [address], [place] [address]. All rights reserved. This information is not intended as a substitute for professional medical care. Always follow your healthcare professional's instructions.        Back Pain (Acute or Chronic)    Back pain is one of the most common problems. The good news is that most people feel better in 1 to 2 weeks, and most of the rest in 1 to 2 months. Most people can remain active.  People experience and describe pain differently; not everyone is the same.  · The pain can be sharp, stabbing, shooting, aching, cramping or burning.  · Movement, standing, bending, lifting, sitting, or walking may worsen pain.  · It can be localized to one spot or area, or it can be more generalized.  · It can spread or radiate upwards, to the front, or go down your arms or legs (sciatica).  · It can cause muscle spasm.  Most of the time, mechanical problems with the muscles or spine cause the pain. Mechanical problems are usually caused by an injury to the muscles or ligaments. While illness can cause back pain, it is usually not caused by a serious illness. Mechanical problems include:   · Physical activity such as sports, exercise, work, or normal activity  · Overexertion, lifting, pushing, pulling incorrectly or too aggressively  · Sudden twisting, bending, or stretching from an accident, or accidental movement  · Poor posture  · Stretching or moving wrong, without noticing pain at the time  · Poor coordination, lack of regular exercise (check with your doctor about this)  · Spinal disc disease or arthritis  · Stress  Pain can also be related to pregnancy, or illness like appendicitis, bladder or kidney infections, pelvic infections, and many other things.  Acute back pain usually gets better in 1 to 2 weeks. Back pain related to disk disease, arthritis in the spinal joints or spinal stenosis (narrowing of the spinal canal) can become chronic and last for months or  years.  Unless you had a physical injury (for example, a car accident or fall) X-rays are usually not needed for the initial evaluation of back pain. If pain continues and does not respond to medical treatment, X-rays and other tests may be needed.  Home care  Try these home care recommendations:  · When in bed, try to find a position of comfort. A firm mattress is best. Try lying flat on your back with pillows under your knees. You can also try lying on your side with your knees bent up towards your chest and a pillow between your knees.  · At first, do not try to stretch out the sore spots. If there is a strain, it is not like the good soreness you get after exercising without an injury. In this case, stretching may make it worse.  · Avoid prolong sitting, long car rides, or travel. This puts more stress on the lower back than standing or walking.  · During the first 24 to 72 hours after an acute injury or flare up of chronic back pain, apply an ice pack to the painful area for 20 minutes and then remove it for 20 minutes. Do this over a period of 60 to 90 minutes or several times a day. This will reduce swelling and pain. Wrap the ice pack in a thin towel or plastic to protect your skin.  · You can start with ice, then switch to heat. Heat (hot shower, hot bath, or heating pad) reduces pain and works well for muscle spasms. Heat can be applied to the painful area for 20 minutes then remove it for 20 minutes. Do this over a period of 60 to 90 minutes or several times a day. Do not sleep on a heating pad. It can lead to skin burns or tissue damage.  · You can alternate ice and heat therapy. Talk with your doctor about the best treatment for your back pain.  · Therapeutic massage can help relax the back muscles without stretching them.  · Be aware of safe lifting methods and do not lift anything without stretching first.  Medicines  Talk to your doctor before using medicine, especially if you have other medical  problems or are taking other medicines.  · You may use over-the-counter medicine as directed on the bottle to control pain, unless another pain medicine was prescribed. If you have chronic conditions like diabetes, liver or kidney disease, stomach ulcers, or gastrointestinal bleeding, or are taking blood thinners, talk to your doctor before taking any medicine.  · Be careful if you are given a prescription medicines, narcotics, or medicine for muscle spasms. They can cause drowsiness, affect your coordination, reflexes, and judgement. Do not drive or operate heavy machinery.  Follow-up care  Follow up with your healthcare provider, or as advised.   A radiologist will review any X-rays that were taken. Your provide will notify you of any new findings that may affect your care.  Call 911  Call emergency services if any of the following occur:  · Trouble breathing  · Confusion  · Very drowsy or trouble awakening  · Fainting or loss of consciousness  · Rapid or very slow heart rate  · Loss of bowel or bladder control  When to seek medical advice  Call your healthcare provider right away if any of these occur:   · Pain becomes worse or spreads to your legs  · Weakness or numbness in one or both legs  · Numbness in the groin or genital area  Date Last Reviewed: 7/1/2016  © 1055-0239 [employer]. [address] [place] [address], [place] [address]. All rights reserved. This information is not intended as a substitute for professional medical care. Always follow your healthcare professional's instructions.        Relieving Tension in Your Back  Being relaxed helps keep your mind healthy and your back ready to move. Take short breaks often. Walk around. Stretch. Switch tasks. Also give the following a try.  Make time to relax. Start by setting aside 5 minutes daily.   Deep breathing    Deep breathing is a simple way to reduce stress. You can do it almost any time you need to relax.  · Inhale slowly through your nose. Let  your lungs and stomach expand.  · Hold your breath for 2 to 3 seconds.  · Exhale slowly through your mouth until your lungs feel empty. Repeat 3 to 4 times.  Relieve tension  Muscle tension can create tender spots called trigger points. The tips below may help relieve muscle tension.  · Press the trigger point if you can reach it. If not, lie on a soft tennis ball, or ask a friend to press the spot. Use steady pressure for 10 to 15 seconds. Breathe deeply. Repeat a few times.  · Massage trigger points with ice for 2 to 5 minutes. Press lightly at first. Slowly increase firmness.  Date Last Reviewed: 10/18/2015  © 1023-1027 [employer]. [address] [place] [address], [place] [address]. All rights reserved. This information is not intended as a substitute for professional medical care. Always follow your healthcare professional's instructions.        Caring for Your Back Throughout the Day  Take care of your back throughout the day. You will likely have fewer back problems if you do. Try to warm up before you move. Shift positions often. Also do your best to form healthy habits.    Warm up for the day  Do a few slow, catlike stretches before starting your day. This simple warmup can soften your disks, stretch your back muscles, and help prevent injuries.  Shift positions often  At work and at home, change positions often. This helps keep your body from getting stiff. Stand up or lean back while you sit. If you can, get up and move every 1/2 hour.  Form healthy habits  Here are some suggestions:   · Keep a healthy weight. When you weigh too much, your back is under excess strain. But losing just a few extra pounds can help a lot.  · Try not to overeat. Learn about serving sizes. The size of a serving depends on the food and the food group. Many foods list serving sizes on the labels.  · Handle minor aches with cold and heat. Apply cold the first 24 to 48 hours. Use heat after that. Always place a thin cloth  between your skin and the source of cold or heat.  · Take medicines as directed. This helps keep pain under control. Always read labels, and call your healthcare provider or pharmacist if you have any questions.  Walk each day  A daily walk keeps your back and thigh muscles stretched and strong. This gives your back better support. Be sure to walk with your spines three curves aligned, by keeping your head, hips, and toes connected by a vertical line.   Date Last Reviewed: 10/18/2015  © 3354-0905 [employer]. [address] [place] [address], [place] [address]. All rights reserved. This information is not intended as a substitute for professional medical care. Always follow your healthcare professional's instructions.

## 2019-10-15 NOTE — PROGRESS NOTES
PM&R CLINIC NOTE    Chief Complaint   Patient presents with    Back Pain     lower back pain     HPI: This is a 38 y.o.  female being seen in clinic today for re-evaluation of chronic low back achy pain with occasional radiation into her legs.  She has tried to restart her back exercises from PT, but can't remember them all.  She denies numbness, tingling, or weakness into her legs.     History obtained from patient    Review of Systems:     General- denies lethargy, weight change, fever, chills  Head/neck- denies swallowing difficulties  ENT- denies hearing changes  Cardiovascular-denies chest pain  Pulmonary- denies shortness of breath  GI- denies constipation or bowel incontinence  - denies bladder incontinence  Skin- denies wounds or rashes  Musculoskeletal- denies weakness, +pain  Neurologic- denies numbness and tingling  Psychiatric- denies depressive or psychotic features, denies anxiety  Lymphatic-denies swelling  Endocrine- denies hypoglycemic symptoms/DM history    Physical Examination:  General: Well developed, well nourished female, NAD  HEENT: NCAT EOMI  Pulmonary: Normal respirations    Spinal Examination: CERVICAL  Active ROM is within normal limits.  Inspection: No deformity of spinal alignment.      Spinal Examination: LUMBAR or THORACIC  Active ROM is within normal limits except mild limitation in extension  Inspection: No deformity of spinal alignment.  No palpable olisthesis.  Palpation: ttp at si joints, tight at paraspinals  Mild faberes +bilaterally  +facet loading bilaterally  slr neg bilaterally    Bilateral Upper and Lower Extremities:  Shoulder/Elbow/Wrist/Hand ROM   Hip/Knee/Ankle ROM wnl  Bilateral Extremities show normal capillary refill.  No signs of cyanosis, rubor, edema, skin changes, or dysvascular changes of appendages.  Nails appear intact.    Neurological Exam:  Cranial Nerves:  II-XII grossly intact    No focal atrophy is noted of either upper or lower extremity.    Gait:  Narrow base and good arm swing.    IMPRESSION/PLAN: This is a 38 y.o.  female with lumbar DJD/DDD, sacroiliitis    1. Handouts on back care, exercise, etc provided  2. Ketorolac 10mg x5 days, avoid NSAIDs, take with food  3. Ice/heat modalities  4. If not improving, consider formal PT referral again.    Angely Guardado M.D.  Physical Medicine and Rehab

## 2019-10-15 NOTE — LETTER
October 15, 2019      Angely Way MD  [address] [place] [address] [address] [place] [address]           [facility]  [address] [facility]  [place] [address]  Phone: [phone]  Fax: [phone]          Patient: Haley Hammer   MR Number: [id number]   YOB: 1980   Date of Visit: 10/15/2019       Dear Dr. Angely Way:    Thank you for referring Haley Hammer to me for evaluation. Attached you will find relevant portions of my assessment and plan of care.    If you have questions, please do not hesitate to call me. I look forward to following Haley Hammer along with you.    Sincerely,    Angely Guardado MD    Enclosure  CC:  No Recipients    If you would like to receive this communication electronically, please contact [email or web address] or [phone] to request more information on [employer] Link access.    For providers and/or their staff who would like to refer a patient to [facility], please contact us through our one-stop-shop provider referral line, [facility], at [phone].    If you feel you have received this communication in error or would no longer like to receive these types of communications, please e-mail [email or web address]

## 2019-10-30 ENCOUNTER — PATIENT MESSAGE (OUTPATIENT)
Dept: INTERNAL MEDICINE | Facility: CLINIC | Age: 39
End: 2019-10-30

## 2020-09-30 ENCOUNTER — PATIENT MESSAGE (OUTPATIENT)
Dept: PHYSICAL MEDICINE AND REHAB | Facility: CLINIC | Age: 40
End: 2020-09-30

## 2020-10-01 RX ORDER — KETOROLAC TROMETHAMINE 10 MG/1
10 TABLET, FILM COATED ORAL 2 TIMES DAILY
Qty: 10 TABLET | Refills: 0 | Status: SHIPPED | OUTPATIENT
Start: 2020-10-01 | End: 2020-10-06

## 2020-12-14 ENCOUNTER — TELEPHONE (OUTPATIENT)
Dept: OBSTETRICS AND GYNECOLOGY | Facility: CLINIC | Age: 40
End: 2020-12-14

## 2020-12-14 NOTE — TELEPHONE ENCOUNTER
Returned call to patient.  She requested a wwe with Dr. Way.  Appt scheduled for 12/16/20 at 1:45 pm.  She confirmed appt, provider and location.  She verbalized understanding of the current visitor and mask policies.

## 2020-12-14 NOTE — TELEPHONE ENCOUNTER
----- Message from Sofy James sent at 12/14/2020 12:39 PM CST -----  Contact: Haley King is calling to schedule her annual appointment before December ends. Please call her back at [phone].      Thanks  DD

## 2020-12-16 ENCOUNTER — OFFICE VISIT (OUTPATIENT)
Dept: OBSTETRICS AND GYNECOLOGY | Facility: CLINIC | Age: 40
End: 2020-12-16
Payer: COMMERCIAL

## 2020-12-16 VITALS
SYSTOLIC BLOOD PRESSURE: 162 MMHG | BODY MASS INDEX: 26.81 KG/M2 | WEIGHT: 160.94 LBS | DIASTOLIC BLOOD PRESSURE: 94 MMHG | HEIGHT: 65 IN

## 2020-12-16 DIAGNOSIS — Z01.419 WELL WOMAN EXAM WITH ROUTINE GYNECOLOGICAL EXAM: Primary | ICD-10-CM

## 2020-12-16 DIAGNOSIS — Z12.4 CERVICAL CANCER SCREENING: ICD-10-CM

## 2020-12-16 PROCEDURE — 3008F PR BODY MASS INDEX (BMI) DOCUMENTED: ICD-10-PCS | Mod: CPTII,S$GLB,, | Performed by: OBSTETRICS & GYNECOLOGY

## 2020-12-16 PROCEDURE — 88175 CYTOPATH C/V AUTO FLUID REDO: CPT

## 2020-12-16 PROCEDURE — 3080F DIAST BP >= 90 MM HG: CPT | Mod: CPTII,S$GLB,, | Performed by: OBSTETRICS & GYNECOLOGY

## 2020-12-16 PROCEDURE — 99999 PR PBB SHADOW E&M-EST. PATIENT-LVL II: ICD-10-PCS | Mod: PBBFAC,,, | Performed by: OBSTETRICS & GYNECOLOGY

## 2020-12-16 PROCEDURE — 99999 PR PBB SHADOW E&M-EST. PATIENT-LVL II: CPT | Mod: PBBFAC,,, | Performed by: OBSTETRICS & GYNECOLOGY

## 2020-12-16 PROCEDURE — 3080F PR MOST RECENT DIASTOLIC BLOOD PRESSURE >= 90 MM HG: ICD-10-PCS | Mod: CPTII,S$GLB,, | Performed by: OBSTETRICS & GYNECOLOGY

## 2020-12-16 PROCEDURE — 3077F PR MOST RECENT SYSTOLIC BLOOD PRESSURE >= 140 MM HG: ICD-10-PCS | Mod: CPTII,S$GLB,, | Performed by: OBSTETRICS & GYNECOLOGY

## 2020-12-16 PROCEDURE — 99395 PR PREVENTIVE VISIT,EST,18-39: ICD-10-PCS | Mod: S$GLB,,, | Performed by: OBSTETRICS & GYNECOLOGY

## 2020-12-16 PROCEDURE — 3077F SYST BP >= 140 MM HG: CPT | Mod: CPTII,S$GLB,, | Performed by: OBSTETRICS & GYNECOLOGY

## 2020-12-16 PROCEDURE — 3008F BODY MASS INDEX DOCD: CPT | Mod: CPTII,S$GLB,, | Performed by: OBSTETRICS & GYNECOLOGY

## 2020-12-16 PROCEDURE — 87624 HPV HI-RISK TYP POOLED RSLT: CPT

## 2020-12-16 PROCEDURE — 99395 PREV VISIT EST AGE 18-39: CPT | Mod: S$GLB,,, | Performed by: OBSTETRICS & GYNECOLOGY

## 2020-12-16 NOTE — PROGRESS NOTES
Subjective:       Patient ID: Haley Hammer is a 39 y.o. female.    Chief Complaint:  Well Woman      History of Present Illness  HPI  Presents for well-woman exam.  Pt has regular monthly periods.  A few times prior to her period, she will have a significant headache.  Still has low back pain, especially if stressed out or after standing to cook for a long time.  Is established with Dr. Guardado.  When her back starts hurting, she will do exercises she learned in PT and that helps.  Pt is still in school, and has changed to an electrical engineering degree.   works in [place], and all 3 children stay here in  with the patient.  She plans to apply for a job in [place] once she graduates.  Last pap: 2017: normal    GYN & OB History  Patient's last menstrual period was 2020.   Date of Last Pap: 2020    OB History    Para Term  AB Living   3 3 3     3   SAB TAB Ectopic Multiple Live Births         0 3      # Outcome Date GA Lbr Yannick/2nd Weight Sex Delivery Anes PTL Lv   3 Term 11/10/14 39w1d  3.189 kg (7 lb 0.5 oz) F CS-LTranv Spinal N DEBORAH   2 Term 13 40w0d  4.082 kg (9 lb) M CS-Unspec Spinal N DEBORAH   1 Term 04/18/10 40w0d  4.536 kg (10 lb) F CS-Unspec Spinal N DEBORAH       Review of Systems  Review of Systems   Constitutional: Negative for activity change, fatigue, fever and unexpected weight change.   Gastrointestinal: Negative for abdominal pain, bloating, constipation, diarrhea, nausea and vomiting.   Endocrine: Negative for hair loss and hot flashes.   Genitourinary: Negative for dysmenorrhea, dyspareunia, dysuria, frequency, genital sores, hematuria, menorrhagia, menstrual problem, pelvic pain, urgency, vaginal bleeding, vaginal discharge, vaginal pain, postcoital bleeding and vaginal odor.   Musculoskeletal: Positive for back pain.   Integumentary:  Negative for rash, hair changes, breast mass, nipple discharge and breast skin changes.   Neurological: Positive for  headaches (sometimes prior to her period).   Hematological: Negative for adenopathy.   Breast: Negative for mass, mastodynia, nipple discharge and skin changes          Objective:    Physical Exam:   Constitutional: She is oriented to person, place, and time. She appears well-developed and well-nourished. No distress.      Neck: Neck supple. No thyromegaly present.     Pulmonary/Chest: Right breast exhibits no inverted nipple, no mass, no nipple discharge, no skin change, no tenderness, no bleeding and no swelling. Left breast exhibits no inverted nipple, no mass, no nipple discharge, no skin change, no tenderness, no bleeding and no swelling. Breasts are symmetrical.        Abdominal: Soft. She exhibits no distension and no mass. There is no abdominal tenderness. There is no rebound and no guarding.     Genitourinary:    Pelvic exam was performed with patient supine.   There is no rash, tenderness, lesion or injury on the right labia. There is no rash, tenderness, lesion or injury on the left labia. Uterus is not deviated, not enlarged, not fixed, not tender, not hosting fibroids and not experiencing uterine prolapse. Cervix is normal. Right adnexum displays no mass, no tenderness and no fullness. Left adnexum displays no mass, no tenderness and no fullness. No erythema, tenderness, bleeding, rectocele or cystocele in the vagina.    No foreign body in the vagina.      No signs of injury in the vagina.   Cervix exhibits no motion tenderness, no discharge and no friability. negative for vaginal discharge       Uterus Size: 6 cm       Neurological: She is alert and oriented to person, place, and time.     Psychiatric: She has a normal mood and affect.          Assessment:        1. Well woman exam with routine gynecological exam    2. Cervical cancer screening                Plan:      Hlaey was seen today for well woman.    Diagnoses and all orders for this visit:    Well woman exam with routine gynecological  exam    Cervical cancer screening  -     Liquid-Based Pap Smear, Screening  -     HPV High Risk Genotypes, PCR    Reviewed updated recommendations for pap smears (every 3 years) in low risk patients.   Recommend annual pelvic exams.  Reviewed recommendations for annual CBE.  Continue exercises and stretching as needed for low back pain.  Can use Excedrin migraine or ibuprofen as needed for headaches prior to her period.  RTC 1 year or sooner prn.  I will contact her PCP regarding her blood pressure.

## 2020-12-16 NOTE — Clinical Note
Fabián Hidalgo,  I saw this patient today for her well-woman exam.  Blood pressure is elevated.  Just wanted to let you know in case you wanted to arrange follow-up for her.  Thank you,  Angely Way

## 2020-12-21 LAB
HPV HR 12 DNA SPEC QL NAA+PROBE: NEGATIVE
HPV16 AG SPEC QL: NEGATIVE
HPV18 DNA SPEC QL NAA+PROBE: NEGATIVE

## 2020-12-23 ENCOUNTER — TELEPHONE (OUTPATIENT)
Dept: INTERNAL MEDICINE | Facility: CLINIC | Age: 40
End: 2020-12-23

## 2020-12-23 NOTE — TELEPHONE ENCOUNTER
I left a vm asking the pt to contact staff regarding her elevated BP and appt needed per Gwen PARR. //pilar

## 2020-12-23 NOTE — TELEPHONE ENCOUNTER
----- Message from Angely Way MD sent at 12/16/2020  2:40 PM CST -----  Fabián Hidalgo,I saw this patient today for her well-woman exam.  Blood pressure is elevated.  Just wanted to let you know in case you wanted to arrange follow-up for her.Thank you,Angely Way

## 2021-01-25 LAB
FINAL PATHOLOGIC DIAGNOSIS: NORMAL
Lab: NORMAL

## 2021-04-28 ENCOUNTER — PATIENT MESSAGE (OUTPATIENT)
Dept: RESEARCH | Facility: HOSPITAL | Age: 41
End: 2021-04-28

## 2021-12-20 ENCOUNTER — TELEPHONE (OUTPATIENT)
Dept: OBSTETRICS AND GYNECOLOGY | Facility: CLINIC | Age: 41
End: 2021-12-20
Payer: OTHER GOVERNMENT

## 2022-02-07 ENCOUNTER — TELEPHONE (OUTPATIENT)
Dept: OBSTETRICS AND GYNECOLOGY | Facility: CLINIC | Age: 42
End: 2022-02-07
Payer: OTHER GOVERNMENT

## 2022-02-07 NOTE — TELEPHONE ENCOUNTER
----- Message from Alondra Solorio sent at 2/7/2022  2:11 PM CST -----  Pt is returning nurse call. Pt can be reached at [phone]

## 2022-02-07 NOTE — TELEPHONE ENCOUNTER
Returned patient call. Patient wanted to know if there was anyway she can move her appointment time up. Informed patient that Dr. Way does nto have any more availability for the day and next available would be end of March. offered patient the same day appointment with a nurse practitioner. Patient states she does not want to see anyone else and she will keep her appointment with Dr. Way.

## 2022-02-07 NOTE — TELEPHONE ENCOUNTER
----- Message from Josselyn Dimas sent at 2/7/2022 12:13 PM CST -----  Contact: self/[phone]  Pt called in regards to rescheduling her appointment on 2-9-22 due to school. Pt would like a call back. Pt sees Angely Way MD    Please advise

## 2022-02-09 ENCOUNTER — OFFICE VISIT (OUTPATIENT)
Dept: OBSTETRICS AND GYNECOLOGY | Facility: CLINIC | Age: 42
End: 2022-02-09
Payer: OTHER GOVERNMENT

## 2022-02-09 VITALS
WEIGHT: 170.19 LBS | DIASTOLIC BLOOD PRESSURE: 38 MMHG | HEIGHT: 65 IN | SYSTOLIC BLOOD PRESSURE: 142 MMHG | BODY MASS INDEX: 28.36 KG/M2

## 2022-02-09 DIAGNOSIS — R42 DIZZINESS: ICD-10-CM

## 2022-02-09 DIAGNOSIS — Z01.419 WELL WOMAN EXAM WITH ROUTINE GYNECOLOGICAL EXAM: Primary | ICD-10-CM

## 2022-02-09 PROCEDURE — 99395 PR PREVENTIVE VISIT,EST,18-39: ICD-10-PCS | Mod: S$PBB,,, | Performed by: OBSTETRICS & GYNECOLOGY

## 2022-02-09 PROCEDURE — 99395 PREV VISIT EST AGE 18-39: CPT | Mod: S$PBB,,, | Performed by: OBSTETRICS & GYNECOLOGY

## 2022-02-09 PROCEDURE — 99999 PR PBB SHADOW E&M-EST. PATIENT-LVL III: CPT | Mod: PBBFAC,,, | Performed by: OBSTETRICS & GYNECOLOGY

## 2022-02-09 PROCEDURE — 99999 PR PBB SHADOW E&M-EST. PATIENT-LVL III: ICD-10-PCS | Mod: PBBFAC,,, | Performed by: OBSTETRICS & GYNECOLOGY

## 2022-02-09 NOTE — PROGRESS NOTES
Subjective:       Patient ID: Haley Hammer is a 41 y.o. female.    Chief Complaint:  Well Woman      History of Present Illness  HPI  Presents for well-woman exam.  No gyn complaints.  She has regular, monthly periods that are normal in flow and last 4 days.  Pt's  lives and works in [place].  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  Lately, she has been having some dizzy spells and intermittent left arm pain.  Last pap: 2020: neg, HPV neg    GYN & OB History  Patient's last menstrual period was 2022 (exact date).   Date of Last Pap: 2021    OB History    Para Term  AB Living   3 3 3     3   SAB IAB Ectopic Multiple Live Births         0 3      # Outcome Date GA Lbr Yannick/2nd Weight Sex Delivery Anes PTL Lv   3 Term 11/10/14 39w1d  3.189 kg (7 lb 0.5 oz) F CS-LTranv Spinal N DEBORAH   2 Term 13 40w0d  4.082 kg (9 lb) M CS-Unspec Spinal N DEBORAH   1 Term 04/18/10 40w0d  4.536 kg (10 lb) F CS-Unspec Spinal N DEBORAH       Review of Systems  Review of Systems   Constitutional: Negative for activity change, fatigue, fever and unexpected weight change.   Gastrointestinal: Negative for abdominal pain, bloating, constipation, diarrhea, nausea and vomiting.   Endocrine: Negative for hair loss and hot flashes.   Genitourinary: Negative for dysmenorrhea, dyspareunia, dysuria, frequency, genital sores, hematuria, menorrhagia, menstrual problem, pelvic pain, urgency, vaginal bleeding, vaginal discharge, vaginal pain, postcoital bleeding and vaginal odor.   Musculoskeletal:        Left arm pain   Integumentary:  Negative for rash, hair changes, breast mass, nipple discharge and breast skin changes.   Neurological: Negative for numbness.        Dizziness   Hematological: Negative for adenopathy.   Breast: Negative for mass, mastodynia, nipple discharge and skin changes          Objective:    Physical Exam:   Constitutional: She is  oriented to person, place, and time. She appears well-developed and well-nourished. No distress.      Neck: No thyromegaly present.     Pulmonary/Chest: Right breast exhibits no inverted nipple, no mass, no nipple discharge, no skin change, no tenderness, no bleeding and no swelling. Left breast exhibits no inverted nipple, no mass, no nipple discharge, no skin change, no tenderness, no bleeding and no swelling. Breasts are symmetrical.        Abdominal: Soft. She exhibits no distension and no mass. There is no abdominal tenderness. There is no rebound and no guarding.     Genitourinary:    Pelvic exam was performed with patient supine.   There is no rash, tenderness, lesion or injury on the right labia. There is no rash, tenderness, lesion or injury on the left labia. Cervix is normal. Right adnexum displays no mass, no tenderness and no fullness. Left adnexum displays no mass, no tenderness and no fullness. No erythema,  no vaginal discharge, tenderness, bleeding, rectocele or cystocele in the vagina.    No foreign body in the vagina.      No signs of injury in the vagina.   Cervix exhibits no motion tenderness, no discharge and no friability. Uterus is not deviated, not enlarged, not fixed, not tender, not hosting fibroids and not experiencing uterine prolapse.        Uterus Size: 6 cm      Lymphadenopathy:        Right: No inguinal adenopathy present.        Left: No inguinal adenopathy present.    Neurological: She is alert and oriented to person, place, and time.     Psychiatric: She has a normal mood and affect.          Assessment:        1. Well woman exam with routine gynecological exam    2. Dizziness                Plan:      Haley was seen today for well woman.    Diagnoses and all orders for this visit:    Well woman exam with routine gynecological exam    Dizziness  -     Ambulatory referral/consult to Family Practice; Future    Reviewed updated recommendations for pap smears (every 3 years) in low  risk patients.   Recommend annual pelvic exams.  Reviewed recommendations for annual CBE.  Pap due in 2023.

## 2022-02-11 ENCOUNTER — HOSPITAL ENCOUNTER (OUTPATIENT)
Dept: CARDIOLOGY | Facility: HOSPITAL | Age: 42
Discharge: HOME | End: 2022-02-11
Payer: OTHER GOVERNMENT

## 2022-02-11 ENCOUNTER — TELEPHONE (OUTPATIENT)
Dept: INTERNAL MEDICINE | Facility: CLINIC | Age: 42
End: 2022-02-11

## 2022-02-11 ENCOUNTER — TELEPHONE (OUTPATIENT)
Dept: PHYSICAL MEDICINE AND REHAB | Facility: CLINIC | Age: 42
End: 2022-02-11
Payer: OTHER GOVERNMENT

## 2022-02-11 ENCOUNTER — OFFICE VISIT (OUTPATIENT)
Dept: INTERNAL MEDICINE | Facility: CLINIC | Age: 42
End: 2022-02-11
Payer: OTHER GOVERNMENT

## 2022-02-11 ENCOUNTER — LAB VISIT (OUTPATIENT)
Dept: LAB | Facility: HOSPITAL | Age: 42
End: 2022-02-11
Attending: NURSE PRACTITIONER
Payer: OTHER GOVERNMENT

## 2022-02-11 VITALS
BODY MASS INDEX: 27.15 KG/M2 | OXYGEN SATURATION: 99 % | WEIGHT: 162.94 LBS | SYSTOLIC BLOOD PRESSURE: 154 MMHG | HEIGHT: 65 IN | HEART RATE: 63 BPM | DIASTOLIC BLOOD PRESSURE: 86 MMHG | TEMPERATURE: 99 F

## 2022-02-11 DIAGNOSIS — M54.2 NECK PAIN: ICD-10-CM

## 2022-02-11 DIAGNOSIS — R20.2 ARM PARESTHESIA, RIGHT: ICD-10-CM

## 2022-02-11 DIAGNOSIS — R42 DIZZINESS: Primary | ICD-10-CM

## 2022-02-11 DIAGNOSIS — R42 DIZZINESS: ICD-10-CM

## 2022-02-11 LAB
BASOPHILS # BLD AUTO: 0.03 K/UL (ref 0–0.2)
BASOPHILS NFR BLD: 0.5 % (ref 0–1.9)
DIFFERENTIAL METHOD: ABNORMAL
EOSINOPHIL # BLD AUTO: 0.1 K/UL (ref 0–0.5)
EOSINOPHIL NFR BLD: 1 % (ref 0–8)
ERYTHROCYTE [DISTWIDTH] IN BLOOD BY AUTOMATED COUNT: 17.1 % (ref 11.5–14.5)
HCT VFR BLD AUTO: 32.1 % (ref 37–48.5)
HGB BLD-MCNC: 9.7 G/DL (ref 12–16)
IMM GRANULOCYTES # BLD AUTO: 0.02 K/UL (ref 0–0.04)
IMM GRANULOCYTES NFR BLD AUTO: 0.3 % (ref 0–0.5)
LYMPHOCYTES # BLD AUTO: 2.4 K/UL (ref 1–4.8)
LYMPHOCYTES NFR BLD: 38.9 % (ref 18–48)
MCH RBC QN AUTO: 22.9 PG (ref 27–31)
MCHC RBC AUTO-ENTMCNC: 30.2 G/DL (ref 32–36)
MCV RBC AUTO: 76 FL (ref 82–98)
MONOCYTES # BLD AUTO: 0.4 K/UL (ref 0.3–1)
MONOCYTES NFR BLD: 6.3 % (ref 4–15)
NEUTROPHILS # BLD AUTO: 3.2 K/UL (ref 1.8–7.7)
NEUTROPHILS NFR BLD: 53 % (ref 38–73)
NRBC BLD-RTO: 0 /100 WBC
PLATELET # BLD AUTO: 369 K/UL (ref 150–450)
PMV BLD AUTO: 10 FL (ref 9.2–12.9)
RBC # BLD AUTO: 4.24 M/UL (ref 4–5.4)
WBC # BLD AUTO: 6.07 K/UL (ref 3.9–12.7)

## 2022-02-11 PROCEDURE — 99999 PR PBB SHADOW E&M-EST. PATIENT-LVL III: CPT | Mod: PBBFAC,,, | Performed by: NURSE PRACTITIONER

## 2022-02-11 PROCEDURE — 80053 COMPREHEN METABOLIC PANEL: CPT | Performed by: NURSE PRACTITIONER

## 2022-02-11 PROCEDURE — 93005 ELECTROCARDIOGRAM TRACING: CPT

## 2022-02-11 PROCEDURE — 99214 OFFICE O/P EST MOD 30 MIN: CPT | Mod: S$PBB,,, | Performed by: NURSE PRACTITIONER

## 2022-02-11 PROCEDURE — 85025 COMPLETE CBC W/AUTO DIFF WBC: CPT | Performed by: NURSE PRACTITIONER

## 2022-02-11 PROCEDURE — 93010 ELECTROCARDIOGRAM REPORT: CPT | Mod: ,,, | Performed by: INTERNAL MEDICINE

## 2022-02-11 PROCEDURE — 84443 ASSAY THYROID STIM HORMONE: CPT | Performed by: NURSE PRACTITIONER

## 2022-02-11 PROCEDURE — 99214 PR OFFICE/OUTPT VISIT, EST, LEVL IV, 30-39 MIN: ICD-10-PCS | Mod: S$PBB,,, | Performed by: NURSE PRACTITIONER

## 2022-02-11 PROCEDURE — 99999 PR PBB SHADOW E&M-EST. PATIENT-LVL III: ICD-10-PCS | Mod: PBBFAC,,, | Performed by: NURSE PRACTITIONER

## 2022-02-11 PROCEDURE — 99213 OFFICE O/P EST LOW 20 MIN: CPT | Mod: PBBFAC | Performed by: NURSE PRACTITIONER

## 2022-02-11 PROCEDURE — 36415 COLL VENOUS BLD VENIPUNCTURE: CPT | Performed by: NURSE PRACTITIONER

## 2022-02-11 PROCEDURE — 83036 HEMOGLOBIN GLYCOSYLATED A1C: CPT | Performed by: NURSE PRACTITIONER

## 2022-02-11 PROCEDURE — 93010 EKG 12-LEAD: ICD-10-PCS | Mod: ,,, | Performed by: INTERNAL MEDICINE

## 2022-02-11 NOTE — TELEPHONE ENCOUNTER
----- Message from Viv Agee MA sent at 2/11/2022  2:56 PM CST -----  Regarding: EMG  See EMG referral, please contact pt to sched appt. Thank you/liz

## 2022-02-11 NOTE — PROGRESS NOTES
Subjective:       Patient ID: Haley Hammer is a 41 y.o. female.    Chief Complaint: Dizziness    HPI    Pt states dizziness in November x 2  No dizziness since  States increased stress due to work;/school    She has intermittent R arm pain/numbness/tingling > year. Reports intermittent neck pain      Past Medical History:   Diagnosis Date    Abnormal Pap smear of vagina     Anemia     Back pain in pregnancy     Rh negative state in antepartum period-  Pt is AB - 2014    Sickle cell trait      does not carry sickle cell trait       Past Surgical History:   Procedure Laterality Date     SECTION      x3     Social History     Socioeconomic History    Marital status:    Tobacco Use    Smoking status: Never Smoker    Smokeless tobacco: Never Used   Substance and Sexual Activity    Alcohol use: No    Drug use: No    Sexual activity: Not Currently     Partners: Male     Birth control/protection: Condom   Other Topics Concern    Are you pregnant or think you may be? No    Breast-feeding No     Review of patient's allergies indicates:  No Known Allergies  No current outpatient medications on file.     No current facility-administered medications for this visit.           Review of Systems   Constitutional: Negative for activity change, appetite change, chills, diaphoresis, fatigue, fever and unexpected weight change.   HENT: Negative for congestion, ear pain, postnasal drip, rhinorrhea, sinus pressure, sinus pain, sneezing, sore throat, tinnitus, trouble swallowing and voice change.    Eyes: Negative for photophobia, pain and visual disturbance.   Respiratory: Negative for cough, chest tightness, shortness of breath and wheezing.    Cardiovascular: Negative for chest pain, palpitations and leg swelling.   Gastrointestinal: Negative for abdominal distention, abdominal pain, constipation, diarrhea, nausea and vomiting.   Genitourinary: Negative for decreased urine volume,  difficulty urinating, dysuria, flank pain, frequency, hematuria and urgency.   Musculoskeletal: Positive for myalgias. Negative for arthralgias, back pain, joint swelling, neck pain and neck stiffness.   Allergic/Immunologic: Negative for immunocompromised state.   Neurological: Positive for dizziness and numbness. Negative for tremors, seizures, syncope, facial asymmetry, speech difficulty, weakness, light-headedness and headaches.   Hematological: Negative for adenopathy. Does not bruise/bleed easily.   Psychiatric/Behavioral: Negative for confusion and sleep disturbance.       Objective:      Physical Exam  Vitals reviewed.   HENT:      Head: Normocephalic and atraumatic.   Eyes:      Extraocular Movements: EOM normal.      Conjunctiva/sclera: Conjunctivae normal.   Cardiovascular:      Rate and Rhythm: Normal rate and regular rhythm.      Pulses: Intact distal pulses.      Heart sounds: Normal heart sounds.   Pulmonary:      Effort: Pulmonary effort is normal.      Breath sounds: Normal breath sounds.   Abdominal:      General: Bowel sounds are normal.      Palpations: Abdomen is soft.   Musculoskeletal:         General: Normal range of motion.      Cervical back: Normal range of motion and neck supple.   Skin:     General: Skin is warm and dry.   Neurological:      Mental Status: She is alert and oriented to person, place, and time.   Psychiatric:         Mood and Affect: Mood normal.         Assessment:     Vitals:    02/11/22 1324   BP: (!) 154/86   Pulse: 63   Temp: 98.5 °F (36.9 °C)         1. Dizziness    2. Arm paresthesia, right    3. Neck pain        Plan:   Dizziness  -     Comprehensive Metabolic Panel; Future; Expected date: 02/11/2022  -     CBC Auto Differential; Future; Expected date: 02/11/2022  -     Hemoglobin A1C; Future; Expected date: 02/11/2022  -     EKG 12-lead; Future; Expected date: 02/11/2022  -     TSH; Future; Expected date: 02/11/2022    Arm paresthesia, right  -     EMG W/  ULTRASOUND AND NERVE CONDUCTION TEST 1 Extremity; Future    Neck pain      Pt no longer having dizziness..not sure of cause - had months ago  EMG R arm

## 2022-02-11 NOTE — TELEPHONE ENCOUNTER
----- Message from Montez Gill LPN sent at 2/11/2022  3:05 PM CST -----  Regarding: RE: MELVIN Fernandez,      Spoke with the pt; emg is scheduled on 03/15.    Thanks,  Stephanie    ----- Message -----  From: Viv Agee MA  Sent: 2/11/2022   2:57 PM CST  To: [facility] Staff  Subject: EMG                                              See EMG referral, please contact pt to sched appt. Thank you/liz

## 2022-02-12 ENCOUNTER — PATIENT MESSAGE (OUTPATIENT)
Dept: INTERNAL MEDICINE | Facility: CLINIC | Age: 42
End: 2022-02-12
Payer: OTHER GOVERNMENT

## 2022-02-12 LAB
ALBUMIN SERPL BCP-MCNC: 3.7 G/DL (ref 3.5–5.2)
ALP SERPL-CCNC: 57 U/L (ref 55–135)
ALT SERPL W/O P-5'-P-CCNC: 13 U/L (ref 10–44)
ANION GAP SERPL CALC-SCNC: 14 MMOL/L (ref 8–16)
AST SERPL-CCNC: 17 U/L (ref 10–40)
BILIRUB SERPL-MCNC: 0.3 MG/DL (ref 0.1–1)
BUN SERPL-MCNC: 9 MG/DL (ref 6–20)
CALCIUM SERPL-MCNC: 9.9 MG/DL (ref 8.7–10.5)
CHLORIDE SERPL-SCNC: 104 MMOL/L (ref 95–110)
CO2 SERPL-SCNC: 22 MMOL/L (ref 23–29)
CREAT SERPL-MCNC: 0.8 MG/DL (ref 0.5–1.4)
EST. GFR  (AFRICAN AMERICAN): >60 ML/MIN/1.73 M^2
EST. GFR  (NON AFRICAN AMERICAN): >60 ML/MIN/1.73 M^2
ESTIMATED AVG GLUCOSE: 100 MG/DL (ref 68–131)
GLUCOSE SERPL-MCNC: 81 MG/DL (ref 70–110)
HBA1C MFR BLD: 5.1 % (ref 4–5.6)
POTASSIUM SERPL-SCNC: 3.8 MMOL/L (ref 3.5–5.1)
PROT SERPL-MCNC: 7.4 G/DL (ref 6–8.4)
SODIUM SERPL-SCNC: 140 MMOL/L (ref 136–145)
TSH SERPL DL<=0.005 MIU/L-ACNC: 0.68 UIU/ML (ref 0.4–4)

## 2022-02-14 DIAGNOSIS — D50.9 IRON DEFICIENCY ANEMIA, UNSPECIFIED IRON DEFICIENCY ANEMIA TYPE: Primary | ICD-10-CM

## 2022-03-08 ENCOUNTER — OFFICE VISIT (OUTPATIENT)
Dept: INTERNAL MEDICINE | Facility: CLINIC | Age: 42
End: 2022-03-08
Payer: OTHER GOVERNMENT

## 2022-03-08 ENCOUNTER — LAB VISIT (OUTPATIENT)
Dept: LAB | Facility: HOSPITAL | Age: 42
End: 2022-03-08
Payer: OTHER GOVERNMENT

## 2022-03-08 VITALS
RESPIRATION RATE: 16 BRPM | TEMPERATURE: 99 F | HEART RATE: 74 BPM | DIASTOLIC BLOOD PRESSURE: 100 MMHG | HEIGHT: 65 IN | OXYGEN SATURATION: 98 % | WEIGHT: 165.38 LBS | BODY MASS INDEX: 27.56 KG/M2 | SYSTOLIC BLOOD PRESSURE: 154 MMHG

## 2022-03-08 DIAGNOSIS — R51.9 ACUTE NONINTRACTABLE HEADACHE, UNSPECIFIED HEADACHE TYPE: ICD-10-CM

## 2022-03-08 DIAGNOSIS — R07.89 CHEST WALL DISCOMFORT: ICD-10-CM

## 2022-03-08 DIAGNOSIS — N92.6 MISSED MENSES: ICD-10-CM

## 2022-03-08 DIAGNOSIS — R03.0 ELEVATED BLOOD PRESSURE READING IN OFFICE WITHOUT DIAGNOSIS OF HYPERTENSION: Primary | ICD-10-CM

## 2022-03-08 LAB — B-HCG UR QL: POSITIVE

## 2022-03-08 PROCEDURE — 99214 OFFICE O/P EST MOD 30 MIN: CPT | Mod: S$PBB,,, | Performed by: NURSE PRACTITIONER

## 2022-03-08 PROCEDURE — 99214 PR OFFICE/OUTPT VISIT, EST, LEVL IV, 30-39 MIN: ICD-10-PCS | Mod: S$PBB,,, | Performed by: NURSE PRACTITIONER

## 2022-03-08 PROCEDURE — 99213 OFFICE O/P EST LOW 20 MIN: CPT | Mod: PBBFAC | Performed by: NURSE PRACTITIONER

## 2022-03-08 PROCEDURE — 99999 PR PBB SHADOW E&M-EST. PATIENT-LVL III: CPT | Mod: PBBFAC,,, | Performed by: NURSE PRACTITIONER

## 2022-03-08 PROCEDURE — 99999 PR PBB SHADOW E&M-EST. PATIENT-LVL III: ICD-10-PCS | Mod: PBBFAC,,, | Performed by: NURSE PRACTITIONER

## 2022-03-08 PROCEDURE — 81025 URINE PREGNANCY TEST: CPT | Performed by: NURSE PRACTITIONER
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
## 2022-03-09 ENCOUNTER — TELEPHONE (OUTPATIENT)
Dept: INTERNAL MEDICINE | Facility: CLINIC | Age: 42
End: 2022-03-09
Payer: OTHER GOVERNMENT

## 2022-03-09 ENCOUNTER — OFFICE VISIT (OUTPATIENT)
Dept: HEMATOLOGY/ONCOLOGY | Facility: CLINIC | Age: 42
End: 2022-03-09
Payer: OTHER GOVERNMENT

## 2022-03-09 DIAGNOSIS — D50.9 IRON DEFICIENCY ANEMIA, UNSPECIFIED IRON DEFICIENCY ANEMIA TYPE: ICD-10-CM

## 2022-03-09 DIAGNOSIS — Z32.01 POSITIVE PREGNANCY TEST: Primary | ICD-10-CM

## 2022-03-09 PROCEDURE — 99243 PR OFFICE CONSULTATION,LEVEL III: ICD-10-PCS | Mod: 95,,, | Performed by: INTERNAL MEDICINE

## 2022-03-09 PROCEDURE — 99243 OFF/OP CNSLTJ NEW/EST LOW 30: CPT | Mod: 95,,, | Performed by: INTERNAL MEDICINE

## 2022-03-09 NOTE — TELEPHONE ENCOUNTER
A user error has taken place: encounter opened in error, closed for administrative reasons, charting done on wrong patient and has been corrected.

## 2022-03-09 NOTE — PROGRESS NOTES
Subjective:      DATE OF VISIT: 3/9/22     The patient location is:  home  The chief complaint leading to consultation is:  Anemia    Visit type: audiovisual    Face to Face time with patient:  10 minutes  30 minutes of total time spent on the encounter, which includes face to face time and non-face to face time preparing to see the patient (eg, review of tests), Obtaining and/or reviewing separately obtained history, Documenting clinical information in the electronic or other health record, Independently interpreting results (not separately reported) and communicating results to the patient/family/caregiver, or Care coordination (not separately reported).         Each patient to whom he or she provides medical services by telemedicine is:  (1) informed of the relationship between the physician and patient and the respective role of any other health care provider with respect to management of the patient; and (2) notified that he or she may decline to receive medical services by telemedicine and may withdraw from such care at any time.    Notes:     ?  Patient ID:?Haley Hammer is a 41 y.o. female.?? MR#: [id number]   ?   REFERRING PROVIDER: Jinny Sharpe [place]  [id number] [facility]  [place] [address]     ? Primary Care Providers:  Desi Anderson MD, MD (General)     CHIEF COMPLAINT: ?Iron deficiency anemia, 1st trimester pregnancy??   ?   HPI    I had the pleasure meeting for the 1st time Ms. Hammer a 41 y.o. woman presenting for further evaluation of anemia.     Labs showed longstanding microcytic anemia since at least 2017. She does have history of menorrhagia.  Last pregnancy 2014 in recently had positive pregnancy test she thinks she is about 4 weeks gestation.  She is 1st OBGYN appointment next week.  She denies history of known oral iron, IV iron therapy or need for blood transfusion.    No evidence of melena, hematochezia, hemoptysis, hematemesis, hematuria or of other unusual  bleeding/bruising.    She has no history of bowel disease, surgery or family history colorectal malignancy.      Review of Systems    ?   A comprehensive 14-point review of systems was reviewed with patient and was negative other than as specified above.   ?   PAST MEDICAL HISTORY:   Past Medical History:   Diagnosis Date    Abnormal Pap smear of vagina     Anemia     Back pain in pregnancy     Rh negative state in antepartum period-  Pt is AB - 2014    Sickle cell trait      does not carry sickle cell trait    ?     PAST SURGICAL HISTORY:   Past Surgical History:   Procedure Laterality Date     SECTION      x3      ?   ALLERGIES:   Allergies as of 2022    (No Known Allergies)      ?   MEDICATIONS:?   No outpatient medications have been marked as taking for the 3/9/22 encounter (Office Visit) with Danette Mason MD.      ?   SOCIAL HISTORY:?   Social History     Tobacco Use    Smoking status: Never Smoker    Smokeless tobacco: Never Used   Substance Use Topics    Alcohol use: No      ?      ?   FAMILY HISTORY:   family history includes No Known Problems in her father and mother.   ?        Objective:      Physical Exam      ?   There were no vitals filed for this visit.   ?   ECOG:?0   General appearance: Generally well appearing, in no acute distress.   Head, eyes, ears, nose, and throat: moist mucous membranes.   Respiratory:  Normal work of breathing  Abdomen: nontender, nondistended.   Extremities: Warm, without edema.   Neurologic: Alert and oriented. Grossly normal strength, coordination, and gait.   Skin: No rashes, ecchymoses or petechial lesion.   Psychiatric:  Normal mood and affect.    ?   Laboratory:  ?   No visits with results within 1 Day(s) from this visit.   Latest known visit with results is:   Lab Visit on 2022   Component Date Value Ref Range Status    Preg Test, Ur 2022 Positive (A)  Final      Lab Results   Component Value Date    WBC 6.07  02/11/2022    HGB 9.7 (L) 02/11/2022    HCT 32.1 (L) 02/11/2022    MCV 76 (L) 02/11/2022     02/11/2022           ?   Assessment/Plan:   Iron deficiency anemia, unspecified iron deficiency anemia type  -     Ambulatory referral/consult to Hematology / Oncology  -     Ferritin; Future; Expected date: 06/09/2022  -     CBC Auto Differential; Future; Expected date: 06/09/2022  -     Iron and TIBC; Future; Expected date: 06/09/2022       1. Iron deficiency anemia, unspecified iron deficiency anemia type          Plan:     # iron deficiency anemia:  Iron deficiency anemia with hemoglobin 9.7, microcytic.  No iron indices and will obtain for baseline however we did discuss strong suspicion for iron deficiency etiology given microcytosis and history of menorrhagia.  Of iron indices within normal limits will need to explore alternative etiology of anemia.  She denies any history of other abnormal bleeding including no melena hematochezia hemoptysis hematemesis hematuria.  Recommend follow-up with OBGYN but will likely need prenatal vitamin; in addition to this would add ferrous sulfate 65 mg b.i.d. with stool softeners as needed.  Will assess for improvement in iron indices from baseline to be obtained next week in 3 months.  If lack of improvement of iron levels and persistent/progressive anemia may need to be considered for IV iron therapy but preference for 2nd cancer trimester pregnancy if necessary.    Follow-Up:     Patient Instructions   labs next week and coordination with OBGYN visit at [facility] in 3 mo with labs couple days prior, virtial and npok

## 2022-03-09 NOTE — PATIENT INSTRUCTIONS
labs next week and coordination with OBGYN visit at [facility] in 3 mo with labs couple days prior, virtial and npok

## 2022-03-14 ENCOUNTER — TELEPHONE (OUTPATIENT)
Dept: OBSTETRICS AND GYNECOLOGY | Facility: CLINIC | Age: 42
End: 2022-03-14
Payer: OTHER GOVERNMENT

## 2022-03-14 ENCOUNTER — HOSPITAL ENCOUNTER (OUTPATIENT)
Facility: HOSPITAL | Age: 42
Discharge: HOME | End: 2022-03-14
Attending: EMERGENCY MEDICINE | Admitting: OBSTETRICS & GYNECOLOGY
Payer: OTHER GOVERNMENT

## 2022-03-14 ENCOUNTER — ANESTHESIA EVENT (OUTPATIENT)
Dept: SURGERY | Facility: HOSPITAL | Age: 42
End: 2022-03-14
Payer: OTHER GOVERNMENT

## 2022-03-14 ENCOUNTER — TELEPHONE (OUTPATIENT)
Dept: PHYSICAL MEDICINE AND REHAB | Facility: CLINIC | Age: 42
End: 2022-03-14
Payer: OTHER GOVERNMENT

## 2022-03-14 ENCOUNTER — ANESTHESIA (OUTPATIENT)
Dept: SURGERY | Facility: HOSPITAL | Age: 42
End: 2022-03-14
Payer: OTHER GOVERNMENT

## 2022-03-14 VITALS
BODY MASS INDEX: 26.89 KG/M2 | DIASTOLIC BLOOD PRESSURE: 60 MMHG | WEIGHT: 161.38 LBS | SYSTOLIC BLOOD PRESSURE: 102 MMHG | HEIGHT: 65 IN

## 2022-03-14 VITALS
RESPIRATION RATE: 29 BRPM | TEMPERATURE: 98 F | OXYGEN SATURATION: 97 % | SYSTOLIC BLOOD PRESSURE: 154 MMHG | HEART RATE: 110 BPM | DIASTOLIC BLOOD PRESSURE: 71 MMHG

## 2022-03-14 DIAGNOSIS — O36.80X0 PREGNANCY WITH INCONCLUSIVE FETAL VIABILITY, SINGLE OR UNSPECIFIED FETUS: Primary | ICD-10-CM

## 2022-03-14 DIAGNOSIS — O36.80X0 PREGNANCY WITH INCONCLUSIVE FETAL VIABILITY, SINGLE OR UNSPECIFIED FETUS: ICD-10-CM

## 2022-03-14 DIAGNOSIS — O02.1 MISSED ABORTION: Primary | ICD-10-CM

## 2022-03-14 DIAGNOSIS — R10.2 PELVIC PAIN: ICD-10-CM

## 2022-03-14 DIAGNOSIS — Z32.00 ENCOUNTER FOR ASSESSMENT FOR SUSPECTED ECTOPIC PREGNANCY: ICD-10-CM

## 2022-03-14 LAB
ABO + RH BLD: NORMAL
ABO GROUP BLD: NORMAL
ALBUMIN SERPL BCP-MCNC: 4.1 G/DL (ref 3.5–5.2)
ALP SERPL-CCNC: 61 U/L (ref 55–135)
ALT SERPL W/O P-5'-P-CCNC: 19 U/L (ref 10–44)
ANION GAP SERPL CALC-SCNC: 8 MMOL/L (ref 8–16)
AST SERPL-CCNC: 20 U/L (ref 10–40)
BASOPHILS # BLD AUTO: 0.02 K/UL (ref 0–0.2)
BASOPHILS NFR BLD: 0.2 % (ref 0–1.9)
BILIRUB SERPL-MCNC: 0.4 MG/DL (ref 0.1–1)
BILIRUB UR QL STRIP: ABNORMAL
BLD GP AB SCN CELLS X3 SERPL QL: NORMAL
BUN SERPL-MCNC: 9 MG/DL (ref 6–20)
CALCIUM SERPL-MCNC: 9.3 MG/DL (ref 8.7–10.5)
CHLORIDE SERPL-SCNC: 104 MMOL/L (ref 95–110)
CLARITY UR: ABNORMAL
CO2 SERPL-SCNC: 28 MMOL/L (ref 23–29)
COLOR UR: ABNORMAL
CREAT SERPL-MCNC: 0.8 MG/DL (ref 0.5–1.4)
CTP QC/QA: YES
DIFFERENTIAL METHOD: ABNORMAL
EOSINOPHIL # BLD AUTO: 0 K/UL (ref 0–0.5)
EOSINOPHIL NFR BLD: 0 % (ref 0–8)
ERYTHROCYTE [DISTWIDTH] IN BLOOD BY AUTOMATED COUNT: 17.2 % (ref 11.5–14.5)
EST. GFR  (AFRICAN AMERICAN): >60 ML/MIN/1.73 M^2
EST. GFR  (NON AFRICAN AMERICAN): >60 ML/MIN/1.73 M^2
GLUCOSE SERPL-MCNC: 107 MG/DL (ref 70–110)
GLUCOSE UR QL STRIP: ABNORMAL
HCG INTACT+B SERPL-ACNC: 3925 MIU/ML
HCT VFR BLD AUTO: 32.7 % (ref 37–48.5)
HGB BLD-MCNC: 10.5 G/DL (ref 12–16)
HGB UR QL STRIP: ABNORMAL
IMM GRANULOCYTES # BLD AUTO: 0.04 K/UL (ref 0–0.04)
IMM GRANULOCYTES NFR BLD AUTO: 0.4 % (ref 0–0.5)
INJECT RH IG VOL PATIENT: NORMAL ML
KETONES UR QL STRIP: ABNORMAL
LEUKOCYTE ESTERASE UR QL STRIP: ABNORMAL
LYMPHOCYTES # BLD AUTO: 1.2 K/UL (ref 1–4.8)
LYMPHOCYTES NFR BLD: 11 % (ref 18–48)
MCH RBC QN AUTO: 23.5 PG (ref 27–31)
MCHC RBC AUTO-ENTMCNC: 32.1 G/DL (ref 32–36)
MCV RBC AUTO: 73 FL (ref 82–98)
MICROSCOPIC COMMENT: ABNORMAL
MONOCYTES # BLD AUTO: 0.3 K/UL (ref 0.3–1)
MONOCYTES NFR BLD: 3.2 % (ref 4–15)
NEUTROPHILS # BLD AUTO: 9.1 K/UL (ref 1.8–7.7)
NEUTROPHILS NFR BLD: 85.2 % (ref 38–73)
NITRITE UR QL STRIP: ABNORMAL
NRBC BLD-RTO: 0 /100 WBC
PH UR STRIP: ABNORMAL [PH] (ref 5–8)
PLATELET # BLD AUTO: 309 K/UL (ref 150–450)
PMV BLD AUTO: 9 FL (ref 9.2–12.9)
POTASSIUM SERPL-SCNC: 3.3 MMOL/L (ref 3.5–5.1)
PROT SERPL-MCNC: 7.8 G/DL (ref 6–8.4)
PROT UR QL STRIP: ABNORMAL
RBC # BLD AUTO: 4.47 M/UL (ref 4–5.4)
RBC #/AREA URNS HPF: >100 /HPF (ref 0–4)
RH BLD: NORMAL
SARS-COV-2 RDRP RESP QL NAA+PROBE: NEGATIVE
SODIUM SERPL-SCNC: 140 MMOL/L (ref 136–145)
SP GR UR STRIP: ABNORMAL (ref 1–1.03)
URN SPEC COLLECT METH UR: ABNORMAL
UROBILINOGEN UR STRIP-ACNC: ABNORMAL EU/DL
WBC # BLD AUTO: 10.62 K/UL (ref 3.9–12.7)

## 2022-03-14 PROCEDURE — 59151 TREAT ECTOPIC PREGNANCY: CPT | Mod: 51,,, | Performed by: OBSTETRICS & GYNECOLOGY

## 2022-03-14 PROCEDURE — 99285 EMERGENCY DEPT VISIT HI MDM: CPT | Mod: 25,27

## 2022-03-14 PROCEDURE — 99999 PR PBB SHADOW E&M-EST. PATIENT-LVL III: CPT | Mod: PBBFAC,,, | Performed by: ADVANCED PRACTICE MIDWIFE

## 2022-03-14 PROCEDURE — 81000 URINALYSIS NONAUTO W/SCOPE: CPT | Performed by: NURSE PRACTITIONER

## 2022-03-14 PROCEDURE — 86900 BLOOD TYPING SEROLOGIC ABO: CPT | Performed by: EMERGENCY MEDICINE

## 2022-03-14 PROCEDURE — 71000039 HC RECOVERY, EACH ADD'L HOUR: Performed by: OBSTETRICS & GYNECOLOGY

## 2022-03-14 PROCEDURE — 25000003 PHARM REV CODE 250: Performed by: ANESTHESIOLOGY

## 2022-03-14 PROCEDURE — 59820 PR SURG RX MISSED ABORTN,1ST TRI: ICD-10-PCS | Mod: ,,, | Performed by: OBSTETRICS & GYNECOLOGY

## 2022-03-14 PROCEDURE — 99499 UNLISTED E&M SERVICE: CPT | Mod: S$PBB,,, | Performed by: ADVANCED PRACTICE MIDWIFE

## 2022-03-14 PROCEDURE — 88305 TISSUE EXAM BY PATHOLOGIST: CPT | Performed by: PATHOLOGY

## 2022-03-14 PROCEDURE — 59820 CARE OF MISCARRIAGE: CPT | Mod: ,,, | Performed by: OBSTETRICS & GYNECOLOGY

## 2022-03-14 PROCEDURE — 80053 COMPREHEN METABOLIC PANEL: CPT | Performed by: NURSE PRACTITIONER

## 2022-03-14 PROCEDURE — 76801 OB US < 14 WKS SINGLE FETUS: CPT | Mod: PBBFAC | Performed by: OBSTETRICS & GYNECOLOGY

## 2022-03-14 PROCEDURE — 84702 CHORIONIC GONADOTROPIN TEST: CPT | Performed by: NURSE PRACTITIONER

## 2022-03-14 PROCEDURE — 25000003 PHARM REV CODE 250: Performed by: NURSE ANESTHETIST, CERTIFIED REGISTERED

## 2022-03-14 PROCEDURE — 63600175 PHARM REV CODE 636 W HCPCS: Performed by: NURSE ANESTHETIST, CERTIFIED REGISTERED

## 2022-03-14 PROCEDURE — G0378 HOSPITAL OBSERVATION PER HR: HCPCS

## 2022-03-14 PROCEDURE — 86901 BLOOD TYPING SEROLOGIC RH(D): CPT | Performed by: EMERGENCY MEDICINE

## 2022-03-14 PROCEDURE — 99219 PR INITIAL OBSERVATION CARE,LEVL II: CPT | Mod: 25,57,, | Performed by: OBSTETRICS & GYNECOLOGY

## 2022-03-14 PROCEDURE — 36000708 HC OR TIME LEV III 1ST 15 MIN: Performed by: OBSTETRICS & GYNECOLOGY

## 2022-03-14 PROCEDURE — 88305 TISSUE EXAM BY PATHOLOGIST: ICD-10-PCS | Mod: 26,,, | Performed by: PATHOLOGY

## 2022-03-14 PROCEDURE — 99499 NO LOS: ICD-10-PCS | Mod: S$PBB,,, | Performed by: ADVANCED PRACTICE MIDWIFE

## 2022-03-14 PROCEDURE — 99213 OFFICE O/P EST LOW 20 MIN: CPT | Mod: PBBFAC | Performed by: ADVANCED PRACTICE MIDWIFE

## 2022-03-14 PROCEDURE — 76801 US OB/GYN PROCEDURE (VIEWPOINT): ICD-10-PCS | Mod: 26,S$PBB,, | Performed by: OBSTETRICS & GYNECOLOGY

## 2022-03-14 PROCEDURE — 63600519 RHOGAM PHARM REV CODE 636 ALT 250 W HCPCS: Performed by: PHYSICIAN ASSISTANT

## 2022-03-14 PROCEDURE — 37000009 HC ANESTHESIA EA ADD 15 MINS: Performed by: OBSTETRICS & GYNECOLOGY

## 2022-03-14 PROCEDURE — 71000033 HC RECOVERY, INTIAL HOUR: Performed by: OBSTETRICS & GYNECOLOGY

## 2022-03-14 PROCEDURE — 85025 COMPLETE CBC W/AUTO DIFF WBC: CPT | Performed by: NURSE PRACTITIONER

## 2022-03-14 PROCEDURE — 00840 ANES IPER PX LOWER ABD NOS: CPT | Performed by: OBSTETRICS & GYNECOLOGY

## 2022-03-14 PROCEDURE — 63600175 PHARM REV CODE 636 W HCPCS: Performed by: ANESTHESIOLOGY

## 2022-03-14 PROCEDURE — 99999 PR PBB SHADOW E&M-EST. PATIENT-LVL III: ICD-10-PCS | Mod: PBBFAC,,, | Performed by: ADVANCED PRACTICE MIDWIFE

## 2022-03-14 PROCEDURE — U0002 COVID-19 LAB TEST NON-CDC: HCPCS | Performed by: NURSE PRACTITIONER

## 2022-03-14 PROCEDURE — 59151 PR RX ECTOP PREG BY SCOPE,RMV TUBE/OVRY: ICD-10-PCS | Mod: AS,,, | Performed by: PHYSICIAN ASSISTANT

## 2022-03-14 PROCEDURE — 96372 THER/PROPH/DIAG INJ SC/IM: CPT | Mod: 59 | Performed by: PHYSICIAN ASSISTANT

## 2022-03-14 PROCEDURE — 63600175 PHARM REV CODE 636 W HCPCS: Performed by: NURSE PRACTITIONER

## 2022-03-14 PROCEDURE — 59151 TREAT ECTOPIC PREGNANCY: CPT | Mod: AS,,, | Performed by: PHYSICIAN ASSISTANT

## 2022-03-14 PROCEDURE — 36415 COLL VENOUS BLD VENIPUNCTURE: CPT | Performed by: NURSE PRACTITIONER

## 2022-03-14 PROCEDURE — 59151 PR RX ECTOP PREG BY SCOPE,RMV TUBE/OVRY: ICD-10-PCS | Mod: 51,,, | Performed by: OBSTETRICS & GYNECOLOGY

## 2022-03-14 PROCEDURE — 96374 THER/PROPH/DIAG INJ IV PUSH: CPT | Mod: 59

## 2022-03-14 PROCEDURE — 88305 TISSUE EXAM BY PATHOLOGIST: CPT | Mod: 26,,, | Performed by: PATHOLOGY

## 2022-03-14 PROCEDURE — 86850 RBC ANTIBODY SCREEN: CPT | Performed by: EMERGENCY MEDICINE

## 2022-03-14 PROCEDURE — 37000008 HC ANESTHESIA 1ST 15 MINUTES: Performed by: OBSTETRICS & GYNECOLOGY

## 2022-03-14 PROCEDURE — 96375 TX/PRO/DX INJ NEW DRUG ADDON: CPT | Mod: 59

## 2022-03-14 PROCEDURE — 27201423 OPTIME MED/SURG SUP & DEVICES STERILE SUPPLY: Performed by: OBSTETRICS & GYNECOLOGY

## 2022-03-14 PROCEDURE — 99219 PR INITIAL OBSERVATION CARE,LEVL II: ICD-10-PCS | Mod: 25,57,, | Performed by: OBSTETRICS & GYNECOLOGY

## 2022-03-14 PROCEDURE — 36000709 HC OR TIME LEV III EA ADD 15 MIN: Performed by: OBSTETRICS & GYNECOLOGY

## 2022-03-14 RX ORDER — DIPHENHYDRAMINE HYDROCHLORIDE 50 MG/ML
25 INJECTION INTRAMUSCULAR; INTRAVENOUS EVERY 6 HOURS PRN
Status: DISCONTINUED | OUTPATIENT
Start: 2022-03-14 | End: 2022-03-14 | Stop reason: HOSPADM

## 2022-03-14 RX ORDER — SODIUM CHLORIDE, SODIUM LACTATE, POTASSIUM CHLORIDE, CALCIUM CHLORIDE 600; 310; 30; 20 MG/100ML; MG/100ML; MG/100ML; MG/100ML
INJECTION, SOLUTION INTRAVENOUS CONTINUOUS PRN
Status: DISCONTINUED | OUTPATIENT
Start: 2022-03-14 | End: 2022-03-14

## 2022-03-14 RX ORDER — SODIUM CHLORIDE 0.9 % (FLUSH) 0.9 %
3 SYRINGE (ML) INJECTION
Status: DISCONTINUED | OUTPATIENT
Start: 2022-03-14 | End: 2022-03-14 | Stop reason: HOSPADM

## 2022-03-14 RX ORDER — MORPHINE SULFATE 4 MG/ML
4 INJECTION, SOLUTION INTRAMUSCULAR; INTRAVENOUS
Status: COMPLETED | OUTPATIENT
Start: 2022-03-14 | End: 2022-03-14

## 2022-03-14 RX ORDER — MIDAZOLAM HYDROCHLORIDE 1 MG/ML
INJECTION INTRAMUSCULAR; INTRAVENOUS
Status: DISCONTINUED | OUTPATIENT
Start: 2022-03-14 | End: 2022-03-14

## 2022-03-14 RX ORDER — PROCHLORPERAZINE EDISYLATE 5 MG/ML
5 INJECTION INTRAMUSCULAR; INTRAVENOUS EVERY 30 MIN PRN
Status: DISCONTINUED | OUTPATIENT
Start: 2022-03-14 | End: 2022-03-14 | Stop reason: HOSPADM

## 2022-03-14 RX ORDER — ONDANSETRON 2 MG/ML
4 INJECTION INTRAMUSCULAR; INTRAVENOUS
Status: COMPLETED | OUTPATIENT
Start: 2022-03-14 | End: 2022-03-14

## 2022-03-14 RX ORDER — SODIUM CHLORIDE 9 MG/ML
INJECTION, SOLUTION INTRAVENOUS CONTINUOUS
Status: DISCONTINUED | OUTPATIENT
Start: 2022-03-14 | End: 2022-03-14 | Stop reason: HOSPADM

## 2022-03-14 RX ORDER — CHLORHEXIDINE GLUCONATE ORAL RINSE 1.2 MG/ML
10 SOLUTION DENTAL
Status: DISCONTINUED | OUTPATIENT
Start: 2022-03-14 | End: 2022-03-14 | Stop reason: HOSPADM

## 2022-03-14 RX ORDER — IBUPROFEN 600 MG/1
600 TABLET ORAL EVERY 8 HOURS PRN
Qty: 30 TABLET | Refills: 0 | Status: SHIPPED | OUTPATIENT
Start: 2022-03-14

## 2022-03-14 RX ORDER — ALBUTEROL SULFATE 0.83 MG/ML
2.5 SOLUTION RESPIRATORY (INHALATION) EVERY 4 HOURS PRN
Status: DISCONTINUED | OUTPATIENT
Start: 2022-03-14 | End: 2022-03-14 | Stop reason: HOSPADM

## 2022-03-14 RX ORDER — ACETAMINOPHEN 10 MG/ML
1000 INJECTION, SOLUTION INTRAVENOUS ONCE
Status: COMPLETED | OUTPATIENT
Start: 2022-03-14 | End: 2022-03-14

## 2022-03-14 RX ORDER — HYDROMORPHONE HYDROCHLORIDE 2 MG/ML
0.2 INJECTION, SOLUTION INTRAMUSCULAR; INTRAVENOUS; SUBCUTANEOUS EVERY 5 MIN PRN
Status: DISCONTINUED | OUTPATIENT
Start: 2022-03-14 | End: 2022-03-14 | Stop reason: HOSPADM

## 2022-03-14 RX ORDER — FERROUS SULFATE 325(65) MG
325 TABLET ORAL DAILY
COMMUNITY

## 2022-03-14 RX ORDER — SUCCINYLCHOLINE CHLORIDE 20 MG/ML
INJECTION INTRAMUSCULAR; INTRAVENOUS
Status: DISCONTINUED | OUTPATIENT
Start: 2022-03-14 | End: 2022-03-14

## 2022-03-14 RX ORDER — LIDOCAINE HYDROCHLORIDE 20 MG/ML
INJECTION INTRAVENOUS
Status: DISCONTINUED | OUTPATIENT
Start: 2022-03-14 | End: 2022-03-14

## 2022-03-14 RX ORDER — KETOROLAC TROMETHAMINE 30 MG/ML
INJECTION, SOLUTION INTRAMUSCULAR; INTRAVENOUS
Status: DISCONTINUED | OUTPATIENT
Start: 2022-03-14 | End: 2022-03-14

## 2022-03-14 RX ORDER — FENTANYL CITRATE 50 UG/ML
INJECTION, SOLUTION INTRAMUSCULAR; INTRAVENOUS
Status: DISCONTINUED | OUTPATIENT
Start: 2022-03-14 | End: 2022-03-14

## 2022-03-14 RX ORDER — ROCURONIUM BROMIDE 10 MG/ML
INJECTION, SOLUTION INTRAVENOUS
Status: DISCONTINUED | OUTPATIENT
Start: 2022-03-14 | End: 2022-03-14

## 2022-03-14 RX ORDER — PROPOFOL 10 MG/ML
VIAL (ML) INTRAVENOUS
Status: DISCONTINUED | OUTPATIENT
Start: 2022-03-14 | End: 2022-03-14

## 2022-03-14 RX ORDER — DEXAMETHASONE SODIUM PHOSPHATE 4 MG/ML
INJECTION, SOLUTION INTRA-ARTICULAR; INTRALESIONAL; INTRAMUSCULAR; INTRAVENOUS; SOFT TISSUE
Status: DISCONTINUED | OUTPATIENT
Start: 2022-03-14 | End: 2022-03-14

## 2022-03-14 RX ORDER — ONDANSETRON 2 MG/ML
INJECTION INTRAMUSCULAR; INTRAVENOUS
Status: DISCONTINUED | OUTPATIENT
Start: 2022-03-14 | End: 2022-03-14

## 2022-03-14 RX ORDER — HYDRALAZINE HYDROCHLORIDE 20 MG/ML
10 INJECTION INTRAMUSCULAR; INTRAVENOUS ONCE
Status: COMPLETED | OUTPATIENT
Start: 2022-03-14 | End: 2022-03-14

## 2022-03-14 RX ORDER — HYDROCODONE BITARTRATE AND ACETAMINOPHEN 5; 325 MG/1; MG/1
1 TABLET ORAL EVERY 6 HOURS PRN
Qty: 15 TABLET | Refills: 0 | Status: SHIPPED | OUTPATIENT
Start: 2022-03-14

## 2022-03-14 RX ORDER — SCOLOPAMINE TRANSDERMAL SYSTEM 1 MG/1
1 PATCH, EXTENDED RELEASE TRANSDERMAL
Status: DISCONTINUED | OUTPATIENT
Start: 2022-03-14 | End: 2022-03-14 | Stop reason: HOSPADM

## 2022-03-14 RX ORDER — LABETALOL HYDROCHLORIDE 5 MG/ML
5 INJECTION, SOLUTION INTRAVENOUS ONCE
Status: DISCONTINUED | OUTPATIENT
Start: 2022-03-14 | End: 2022-03-14

## 2022-03-14 RX ORDER — MEPERIDINE HYDROCHLORIDE 25 MG/ML
12.5 INJECTION INTRAMUSCULAR; INTRAVENOUS; SUBCUTANEOUS ONCE
Status: DISCONTINUED | OUTPATIENT
Start: 2022-03-14 | End: 2022-03-14 | Stop reason: HOSPADM

## 2022-03-14 RX ORDER — OXYCODONE HYDROCHLORIDE 5 MG/1
5 TABLET ORAL
Status: DISCONTINUED | OUTPATIENT
Start: 2022-03-14 | End: 2022-03-14 | Stop reason: HOSPADM

## 2022-03-14 RX ORDER — ONDANSETRON 2 MG/ML
4 INJECTION INTRAMUSCULAR; INTRAVENOUS DAILY PRN
Status: DISCONTINUED | OUTPATIENT
Start: 2022-03-14 | End: 2022-03-14 | Stop reason: HOSPADM

## 2022-03-14 RX ORDER — KETOROLAC TROMETHAMINE 30 MG/ML
15 INJECTION, SOLUTION INTRAMUSCULAR; INTRAVENOUS EVERY 8 HOURS PRN
Status: DISCONTINUED | OUTPATIENT
Start: 2022-03-14 | End: 2022-03-14 | Stop reason: HOSPADM

## 2022-03-14 RX ADMIN — FENTANYL CITRATE 25 MCG: 50 INJECTION, SOLUTION INTRAMUSCULAR; INTRAVENOUS at 04:03

## 2022-03-14 RX ADMIN — FENTANYL CITRATE 25 MCG: 50 INJECTION, SOLUTION INTRAMUSCULAR; INTRAVENOUS at 05:03

## 2022-03-14 RX ADMIN — SCOPOLAMINE 1 PATCH: 1.5 PATCH, EXTENDED RELEASE TRANSDERMAL at 02:03

## 2022-03-14 RX ADMIN — ONDANSETRON 4 MG: 2 INJECTION, SOLUTION INTRAMUSCULAR; INTRAVENOUS at 04:03

## 2022-03-14 RX ADMIN — PROPOFOL 50 MG: 10 INJECTION, EMULSION INTRAVENOUS at 05:03

## 2022-03-14 RX ADMIN — SODIUM CHLORIDE, SODIUM LACTATE, POTASSIUM CHLORIDE, AND CALCIUM CHLORIDE: .6; .31; .03; .02 INJECTION, SOLUTION INTRAVENOUS at 04:03

## 2022-03-14 RX ADMIN — HYDRALAZINE HYDROCHLORIDE 10 MG: 20 INJECTION, SOLUTION INTRAMUSCULAR; INTRAVENOUS at 06:03

## 2022-03-14 RX ADMIN — HUMAN RHO(D) IMMUNE GLOBULIN 300 MCG: 300 INJECTION, SOLUTION INTRAMUSCULAR at 04:03

## 2022-03-14 RX ADMIN — ESMOLOL HYDROCHLORIDE 20 MG: 10 INJECTION INTRAVENOUS at 05:03

## 2022-03-14 RX ADMIN — KETOROLAC TROMETHAMINE 30 MG: 30 INJECTION, SOLUTION INTRAMUSCULAR at 05:03

## 2022-03-14 RX ADMIN — PROPOFOL 200 MG: 10 INJECTION, EMULSION INTRAVENOUS at 04:03

## 2022-03-14 RX ADMIN — LIDOCAINE HYDROCHLORIDE 100 MG: 20 INJECTION, SOLUTION INTRAVENOUS at 04:03

## 2022-03-14 RX ADMIN — ONDANSETRON 4 MG: 2 INJECTION INTRAMUSCULAR; INTRAVENOUS at 12:03

## 2022-03-14 RX ADMIN — MIDAZOLAM HYDROCHLORIDE 2 MG: 1 INJECTION, SOLUTION INTRAMUSCULAR; INTRAVENOUS at 04:03

## 2022-03-14 RX ADMIN — SUCCINYLCHOLINE CHLORIDE 140 MG: 20 INJECTION, SOLUTION INTRAMUSCULAR; INTRAVENOUS at 04:03

## 2022-03-14 RX ADMIN — DEXAMETHASONE SODIUM PHOSPHATE 4 MG: 4 INJECTION, SOLUTION INTRAMUSCULAR; INTRAVENOUS at 04:03

## 2022-03-14 RX ADMIN — ACETAMINOPHEN 1000 MG: 10 INJECTION INTRAVENOUS at 06:03

## 2022-03-14 RX ADMIN — PROPOFOL 50 MG: 10 INJECTION, EMULSION INTRAVENOUS at 04:03

## 2022-03-14 RX ADMIN — MORPHINE SULFATE 4 MG: 4 INJECTION INTRAVENOUS at 12:03

## 2022-03-14 RX ADMIN — ROCURONIUM BROMIDE 5 MG: 10 INJECTION, SOLUTION INTRAVENOUS at 04:03

## 2022-03-14 RX ADMIN — ROCURONIUM BROMIDE 25 MG: 10 INJECTION, SOLUTION INTRAVENOUS at 04:03

## 2022-03-14 NOTE — ASSESSMENT & PLAN NOTE
Patient seeing Heme/Onc for iron deficiency anemia  -acute blood loss on chronic anemia, currently H/H is stable  -will need to continue iron repletion  -Type and screen and continue to trend H/H

## 2022-03-14 NOTE — OP NOTE
Operative Note       SURGERY DATE:  3/14/2022     PRE-OP DIAGNOSIS:  Missed  [O02.1]  Encounter for assessment for suspected ectopic pregnancy [Z32.00]    POST-OP DIAGNOSIS:  Missed  [O02.1]  Encounter for assessment for suspected ectopic pregnancy [Z32.00]  Suspected heterotopic pregnancy with left ectopic pregnancy    Procedure(s) (LRB):  DILATION AND CURETTAGE, UTERUS, USING SUCTION (N/A)  SALPINGECTOMY, LAPAROSCOPIC (Left)  REMOVAL, ECTOPIC PREGNANCY, LAPAROSCOPIC (Left)    Surgeon(s) and Role:     * Angely Way MD - Primary    ASSISTANT:  Evangelina Malin PA-C, assistance necessary for completion of the surgery    TASKS PERFORMED BY ASSISTANT:  Retraction, Exposure, Hemostasis, Closure    ANESTHESIA: General    FINDINGS:   Cervix 1.5cm dilated with clot extruding from it.  Uterus 10 week size.  A large amount of tissue was removed from the uterus consistent with products of conception.  Endometrial cavity with gritty texture throughout following curettage.    On laparoscopy, 50 cc hemoperitoneum present.  Left fallopian tube appeared dilated and felt firm with hemorrhagic material within it concerning for ectopic pregnancy. Decision was made to perform left salpingectomy.  During salpingectomy, mesosalpinx was friable and very oozy.  Right fallopian tube appears normal and freely mobile.  Bladder adhesions noted to lower uterine segment.      GRAFTS/IMPLANTS:  None    ESTIMATED BLOOD LOSS: 20 mL procedural blood loss (50 cc hemoperitoneum present at the beginning of the case)              COMPLICATIONS:  None    SPECIMEN:  Products of conception from D&C, and left fallopian tube with suspected ectopic pregnancy    DESCRIPTION OF PROCEDURE:    The patient was taken to the Operating Room where general        endotracheal anesthesia was induced and found to be adequate.  Her abdomen and perineum were then prepped and draped in normal sterile fashion, and      Healy catheter was placed.  Time out  was performed.                                                                     A weighted sterile speculum was     then placed in the patient's vagina, and the anterior lip of the cervix      was grasped with single-tooth tenaculum.  The uterine cervix was already dilated 1.5cm.  A 10mm suction curette was     then advanced into the uterus and suction was applied.  Several passes       were  made and products of conception were removed.  A large amount of tissue was removed during curettage. Sharp      curettage was then performed.    Repeated suction curettage was then performed until it felt        that  all products of conception were removed.  Sharp curettage was then           performed again, and a gritty texture was noted on all 4 walls of the uterus.       The patient had no bleeding at the end of the case.   The Zumi intrauterine manipulator was then placed.   The single-tooth tenaculum was removed from the anterior lip of the cervix and excellent hemostasis was noted.     We then proceeded with the laparoscopy portion of the procedure.    The periumbilical skin was tented with perforating towel clamps, and a Verress needle was inserted through the umbilicus into the intraperitoneal cavity.  Intraperitoneal placement was confirmed with a water drop test, and pneumoperitoneum was achieved with Carbon Dioxide gas up to a pressure of 15 mmHg.  The verress needle was removed, and a 5 mm skin incision was made in the umbilicus. A 5 mm trocar was inserted through this incision, and the scope was inserted through this trocar.  Immediate inspection of underlying organs revealed no damage or injury.  She was then placed in Trendelenburg position.  The pelvic organs were examined, and the findings stated above were noted.  A 5 mm LLQ accessory trocar and a 8 mm RLQ accessory trocar were placed.  The intraperitoneal blood was removed with the suction .  50cc of blood was removed from the pelvis.    The  left fallopian tube was noted to be dilated and firm with hemorrhagic material in it with bleeding near the fimbriated end concerning for ruptured ectopic pregnancy.  Salpingectomy was then performed working distally to proximally with the Harmonic scalpel to remove the left fallopian tube.  The tube was then removed from the abdomen with an Endo Catch bag.  Oozing along the mesosalpinx, which was edematous and friable, was well-controlled with monopolar cautery.     The pelvis was then copiously irrigated and cleared of all clots and debris. All instruments were removed from the trocars and pneumoperitoneum was allowed to escape.  The patient was taken out of Trendelenburg position, and all trocars were removed under direct visualization.  Hemostasis at all skin sites was achieved with the Bovie cautery.  All skin sites were closed with 4-0 Monocryl in a running, subcuticular fashion.  All instruments were removed from the vagina.  Sponge, laparotomy sponge, and needle counts were correct.  She will go to recovery in stable condition.                CONDITION: Good    DISPOSITION: PACU - hemodynamically stable.

## 2022-03-14 NOTE — PHARMACY MED REC
"Admission Medication History     The home medication history was taken by Piyush Sanchez.    You may go to "Admission" then "Reconcile Home Medications" tabs to review and/or act upon these items.      The home medication list has been updated by the Pharmacy department.    Please read ALL comments highlighted in yellow.    Please address this information as you see fit.     Feel free to contact us if you have any questions or require assistance.        Medications listed below were obtained from: Patient/family  (Not in a hospital admission)      Potential issues to be addressed PRIOR TO DISCHARGE: NONE      Piyush Sanchez, [facility] [phone]      Current Outpatient Medications on File Prior to Encounter   Medication Sig Dispense Refill Last Dose    ferrous sulfate (FEOSOL) 325 mg (65 mg iron) Tab tablet Take 325 mg by mouth once daily.   Past Week at Unknown time                             .          "

## 2022-03-14 NOTE — ANESTHESIA PREPROCEDURE EVALUATION
2022  Haley Hammer is a 41 y.o., female.      Pre-op Assessment    I have reviewed the Patient Summary Reports.    I have reviewed the NPO Status.   I have reviewed the Medications.     Review of Systems  Anesthesia Hx:  Denies Family Hx of Anesthesia complications.   Denies Personal Hx of Anesthesia complications.   Hematology/Oncology:     Oncology Normal    -- Anemia: Hematology Comments: Chronic iron deficiency anemia  Sickle cell trait    EENT/Dental:EENT/Dental Normal   Cardiovascular:  Cardiovascular Normal     Pulmonary:  Pulmonary Normal    Renal/:   Missed   Possible ruptured ectopic pregnancy   Hepatic/GI:  Hepatic/GI Normal    Musculoskeletal:  Spine Disorders: lumbar    Neurological:  Neurology Normal    Endocrine:  Endocrine Normal    Dermatological:  Skin Normal    Psych:  Psychiatric Normal           Physical Exam  General: Alert and Oriented    Airway:  Mallampati: II   Mouth Opening: Normal  TM Distance: Normal  Tongue: Normal  Neck ROM: Normal ROM        Anesthesia Plan  Type of Anesthesia, risks & benefits discussed:    Anesthesia Type: Gen ETT  Post Op Pain Control Plan: multimodal analgesia  Induction:  IV and rapid sequence  Airway Plan: Direct  Informed Consent: Informed consent signed with the Patient and all parties understand the risks and agree with anesthesia plan.  All questions answered. Patient consented to blood products? Yes  ASA Score: 2 Emergent  Day of Surgery Review of History & Physical: I have interviewed and examined the patient. I have reviewed the patient's H&P dated:     Ready For Surgery From Anesthesia Perspective.     .

## 2022-03-14 NOTE — ASSESSMENT & PLAN NOTE
Large amount of inta uterine blood on ultrasound  -plan for urgent suction D&C followed by diagnostic laparoscopy

## 2022-03-14 NOTE — ANESTHESIA PROCEDURE NOTES
Intubation    Date/Time: 3/14/2022 4:48 PM  Performed by: Alisia Hansen CRNA  Authorized by: Alisia Hansen CRNA     Intubation:     Induction:  Rapid sequence induction    Intubated:  Postinduction    Mask Ventilation:  N/a    Attempts:  1    Attempted By:  CRNA    Method of Intubation:  Direct    Blade:  Contreras 2    Laryngeal View Grade: Grade I - full view of cords      Difficult Airway Encountered?: No      Complications:  None    Airway Device:  Oral endotracheal tube    Airway Device Size:  6.5    Style/Cuff Inflation:  Cuffed (inflated to minimal occlusive pressure)    Tube secured:  21    Secured at:  The lips    Placement Verified By:  Capnometry    Complicating Factors:  None    Findings Post-Intubation:  BS equal bilateral and atraumatic/condition of teeth unchanged (lips/mucosa intact)

## 2022-03-14 NOTE — SUBJECTIVE & OBJECTIVE
OB History    Para Term  AB Living   3 3 3 0 0 3   SAB IAB Ectopic Multiple Live Births   0 0 0 0 3      # Outcome Date GA Lbr Yannick/2nd Weight Sex Delivery Anes PTL Lv   3 Term 11/10/14 39w1d  3.189 kg (7 lb 0.5 oz) F CS-LTranv Spinal N DEBORAH      Apgar1: 9  Apgar5: 9   2 Term 13 40w0d  4.082 kg (9 lb) M CS-Unspec Spinal N DEBORAH   1 Term 04/18/10 40w0d  4.536 kg (10 lb) F CS-Unspec Spinal N DEBORAH     Past Medical History:   Diagnosis Date    Abnormal Pap smear of vagina     Anemia     Back pain in pregnancy     Rh negative state in antepartum period-  Pt is AB - 2014    Sickle cell trait      does not carry sickle cell trait     Past Surgical History:   Procedure Laterality Date     SECTION      x3       (Not in a hospital admission)      Review of patient's allergies indicates:  No Known Allergies     Family History       Problem Relation (Age of Onset)    No Known Problems Mother, Father          Tobacco Use    Smoking status: Never Smoker    Smokeless tobacco: Never Used   Substance and Sexual Activity    Alcohol use: No    Drug use: No    Sexual activity: Not Currently     Partners: Male     Birth control/protection: Condom     Review of Systems   Objective:     Vital Signs (Most Recent):  Temp: 98.3 °F (36.8 °C) (22 1208)  Pulse: (!) 54 (22 1208)  Resp: 19 (22 1248)  BP: (!) 155/78 (22 1208)  SpO2: 98 % (22 1208)   Vital Signs (24h Range):  Temp:  [98.3 °F (36.8 °C)] 98.3 °F (36.8 °C)  Pulse:  [54] 54  Resp:  [18-19] 19  SpO2:  [98 %] 98 %  BP: (102-155)/(60-78) 155/78        There is no height or weight on file to calculate BMI.    No LMP recorded.    Physical Exam:   Constitutional: She is oriented to person, place, and time. She appears well-developed and well-nourished. No distress.       Cardiovascular:  Normal rate, regular rhythm and normal heart sounds.            No murmur heard.   Pulmonary/Chest: Effort normal and breath sounds normal.  No respiratory distress. She has no wheezes. She has no rales.        Abdominal: Soft. Bowel sounds are normal. She exhibits no distension. There is abdominal tenderness. There is guarding.     Genitourinary:    Genitourinary Comments: Large amount of blood on bimanual exam             Musculoskeletal: No edema.       Neurological: She is alert and oriented to person, place, and time.    Skin: Skin is warm and dry. No rash noted. She is not diaphoretic.      Laboratory:  CBC:   Recent Labs   Lab 03/14/22  1242   WBC 10.62   RBC 4.47   HGB 10.5*   HCT 32.7*      MCV 73*   MCH 23.5*   MCHC 32.1       Diagnostic Results:  Labs: Reviewed  US: Reviewed

## 2022-03-14 NOTE — HPI
"Patient is a 41 y.o.  who presented to clinic today for pregnancy risk assessment and referred to ED for concerns of severe abdominal pain and bleeding. She reports a positive pregnancy test several weeks ago. Noticed a small amount of blood 3 days ago and then yesterday began to have heavier bleeding like a menstrual cycle. She reports today onset of severe pelvic pain "never had pain like this before" along with passage of large fist like clot. She continues to have severe pain that is worse with lying supine. No nausea or vomiting. No fever  "

## 2022-03-14 NOTE — ANESTHESIA POSTPROCEDURE EVALUATION
Anesthesia Post Evaluation    Patient: Haley Hammer    Procedure(s) Performed: Procedure(s) (LRB):  DILATION AND CURETTAGE, UTERUS, USING SUCTION (N/A)  SALPINGECTOMY, LAPAROSCOPIC (Left)  REMOVAL, ECTOPIC PREGNANCY, LAPAROSCOPIC (Left)    Final Anesthesia Type: general      Patient location during evaluation: PACU  Patient participation: Yes- Able to Participate  Level of consciousness: awake and alert  Post-procedure vital signs: reviewed and stable  Pain management: adequate  Airway patency: patent  PA mitigation strategies: Verification of full reversal of neuromuscular block  PONV status at discharge: No PONV  Anesthetic complications: no      Cardiovascular status: hemodynamically stable  Respiratory status: spontaneous ventilation  Hydration status: euvolemic  Follow-up not needed.          Vitals Value Taken Time   /95 03/14/22 1810   Temp  03/14/22 1813   Pulse 90 03/14/22 1812   Resp 37 03/14/22 1812   SpO2 100 % 03/14/22 1812   Vitals shown include unvalidated device data.      No case tracking events are documented in the log.      Pain/Clarissa Score: Pain Rating Prior to Med Admin: 10 (3/14/2022 12:48 PM)

## 2022-03-14 NOTE — BRIEF OP NOTE
[facility] (Hospital)  Brief Operative Note    Surgery Date: 3/14/2022     Surgeon(s) and Role:     * Angely Way MD - Primary    Assisting Surgeon: None    Pre-op Diagnosis:  Missed  [O02.1]  Encounter for assessment for suspected ectopic pregnancy [Z32.00]    Post-op Diagnosis:  Post-Op Diagnosis Codes:     * Missed  [O02.1]     * Encounter for assessment for suspected ectopic pregnancy [Z32.00]  Heterotopic pregnancy with suspected left ectopic pregnancy    Procedure(s) (LRB):  DILATION AND CURETTAGE, UTERUS, USING SUCTION (N/A)  SALPINGECTOMY, LAPAROSCOPIC (Left)  REMOVAL, ECTOPIC PREGNANCY, LAPAROSCOPIC (Left)    Anesthesia: General    Operative Findings: Cervix 1.5cm dilated with clot extruding from it.  Uterus 10 week size.  A large amount of tissue was removed from the uterus consistent with products of conception.  Endometrial cavity with gritty texture throughout following curettage.  On laparoscopy, 50 cc hemoperitoneum present.  Left fallopian tube appeared dilated and felt firm with hemorrhagic material within it concerning for ectopic pregnancy. Decision was made to perform left salpingectomy.  During salpingectomy, mesosalpinx was friable and very oozy.  Right fallopian tube appears normal and freely mobile.  Bladder adhesions noted to lower uterine segment.    Estimated Blood Loss: 20 mL surgical/procedural blood loss (50 cc hemoperitoneum which was already present was evacuated)         Specimens:   Specimen (24h ago, onward)             Start     Ordered    22 5716  Specimen to Pathology, Surgery Gynecology and Obstetrics  Once        Comments: Pre-op Diagnosis: Missed  [O02.1]  Encounter for assessment for suspected ectopic pregnancy [Z32.00]    Procedure(s):  LAPAROSCOPY, DIAGNOSTIC  DILATION AND CURETTAGE, UTERUS, USING SUCTION  SALPINGECTOMY, LAPAROSCOPIC  REMOVAL, ECTOPIC PREGNANCY, LAPAROSCOPIC     Number of specimens: 2    Name of specimens: 1.  Products of Conception (PERM), 2. Left Fallopian Tube-Suspected Ectopic Pregnancy (PERM)   References:    Click here for ordering Quick Tip   Question Answer Comment   Procedure Type: Gynecology and Obstetrics    Specimen Class: Routine/Screening    Release to patient Immediate        03/14/22 2866                  Discharge Note    OUTCOME: Patient tolerated treatment/procedure well without complication and is now ready for discharge.    DISPOSITION: Home or Self Care    FINAL DIAGNOSIS:  Encounter for assessment for suspected ectopic pregnancy    FOLLOWUP: In clinic    DISCHARGE INSTRUCTIONS:    Discharge Procedure Orders   Diet Adult Regular     Lifting restrictions     Pelvic Rest     Notify your health care provider if you experience any of the following:  temperature >100.4     Notify your health care provider if you experience any of the following:  persistent nausea and vomiting or diarrhea     Notify your health care provider if you experience any of the following:  severe uncontrolled pain     Notify your health care provider if you experience any of the following:  redness, tenderness, or signs of infection (pain, swelling, redness, odor or green/yellow discharge around incision site)     Notify your health care provider if you experience any of the following:  difficulty breathing or increased cough     Notify your health care provider if you experience any of the following:  severe persistent headache     Notify your health care provider if you experience any of the following:  worsening rash     Notify your health care provider if you experience any of the following:  persistent dizziness, light-headedness, or visual disturbances     Notify your health care provider if you experience any of the following:  increased confusion or weakness     Leave dressing on - Keep it clean, dry, and intact until clinic visit        Clinical Reference Documents Added to Patient Instructions       Document    DILATION  AND CURETTAGE (D AND C) (ENGLISH)    ECTOPIC PREGNANCY DISCHARGE INSTRUCTIONS (ENGLISH)    LAPAROSCOPY (ENGLISH)    MINIMALLY INVASIVE SURGERY (ENGLISH)

## 2022-03-14 NOTE — ED PROVIDER NOTES
HISTORY     Chief Complaint   Patient presents with    Vaginal Bleeding     Vaginal bleeding and cramping.  10 weeks pregnant     Review of patient's allergies indicates:  No Known Allergies     HPI   The history is provided by the patient. No  was used.   Vaginal Bleeding  This is a new problem. Episode onset: Today  Pertinent negatives include no chest pain, no headaches and no shortness of breath. Associated symptoms comments: +lower abdominal cramping. Nothing aggravates the symptoms. Nothing relieves the symptoms. She has tried nothing for the symptoms.    Patient is a  approximately 10 weeks pregnant and reports that she has had abdominal cramping throughout the week.  States that she started having vaginal bleeding this morning with large clots.  She had an appointment this morning with Ob and she was sent to the ER for a possible ectopic pregnancy.    PCP: Desi Anderson MD     Past Medical History:  Past Medical History:   Diagnosis Date    Abnormal Pap smear of vagina     Anemia     Back pain in pregnancy     Rh negative state in antepartum period-  Pt is AB - 2014    Sickle cell trait      does not carry sickle cell trait        Past Surgical History:  Past Surgical History:   Procedure Laterality Date     SECTION      x3        Family History:  Family History   Problem Relation Age of Onset    No Known Problems Mother     No Known Problems Father     Breast cancer Neg Hx     Ovarian cancer Neg Hx     Deep vein thrombosis Neg Hx     Colon cancer Neg Hx     Eczema Neg Hx     Lupus Neg Hx     Psoriasis Neg Hx         Social History:  Social History     Tobacco Use    Smoking status: Never Smoker    Smokeless tobacco: Never Used   Substance and Sexual Activity    Alcohol use: No    Drug use: No    Sexual activity: Not Currently     Partners: Male     Birth control/protection: Condom         ROS   Review of Systems   Constitutional:  Negative for fever.   HENT: Negative for sore throat.    Respiratory: Negative for shortness of breath.    Cardiovascular: Negative for chest pain.   Gastrointestinal: Negative for nausea.        +lower abdominal cramping   Genitourinary: Positive for vaginal bleeding. Negative for dysuria.   Musculoskeletal: Negative for back pain.   Skin: Negative for rash.   Neurological: Negative for weakness and headaches.   Hematological: Does not bruise/bleed easily.       PHYSICAL EXAM     Initial Vitals [03/14/22 1208]   BP Pulse Resp Temp SpO2   (!) 155/78 (!) 54 18 98.3 °F (36.8 °C) 98 %      MAP       --           Physical Exam    Nursing note and vitals reviewed.  Constitutional: She appears well-developed and well-nourished. She is not diaphoretic. No distress.   HENT:   Head: Normocephalic and atraumatic.   Eyes: Right eye exhibits no discharge. Left eye exhibits no discharge.   Neck: Neck supple.   Normal range of motion.  Cardiovascular: Normal rate.   Pulmonary/Chest: No respiratory distress.   Abdominal: Abdomen is soft. She exhibits no distension. There is abdominal tenderness (Lower abdomen).   Genitourinary:    Genitourinary Comments: Completed by dr. madison      Musculoskeletal:         General: Normal range of motion.      Cervical back: Normal range of motion and neck supple.     Neurological: She is alert and oriented to person, place, and time. She has normal strength.   Skin: Skin is warm and dry.   Psychiatric: She has a normal mood and affect. Her behavior is normal. Thought content normal.          ED COURSE   Procedures  ED ONGOING VITALS:  Vitals:    03/14/22 1208 03/14/22 1248   BP: (!) 155/78    Pulse: (!) 54    Resp: 18 19   Temp: 98.3 °F (36.8 °C)    TempSrc: Oral    SpO2: 98%          ABNORMAL LAB VALUES:  Labs Reviewed   CBC W/ AUTO DIFFERENTIAL - Abnormal; Notable for the following components:       Result Value    Hemoglobin 10.5 (*)     Hematocrit 32.7 (*)     MCV 73 (*)     MCH 23.5 (*)      RDW 17.2 (*)     MPV 9.0 (*)     Gran # (ANC) 9.1 (*)     Gran % 85.2 (*)     Lymph % 11.0 (*)     Mono % 3.2 (*)     All other components within normal limits    Narrative:     Release to patient->Immediate   COMPREHENSIVE METABOLIC PANEL - Abnormal; Notable for the following components:    Potassium 3.3 (*)     All other components within normal limits    Narrative:     Release to patient->Immediate   SARS-COV-2 RDRP GENE - Normal    Narrative:     This test utilizes isothermal nucleic acid amplification   technology to detect the SARS-CoV-2 RdRp nucleic acid segment.   The analytical sensitivity (limit of detection) is 125 genome   equivalents/mL.   A POSITIVE result implies infection with the SARS-CoV-2 virus;   the patient is presumed to be contagious.     A NEGATIVE result means that SARS-CoV-2 nucleic acids are not   present above the limit of detection. A NEGATIVE result should be   treated as presumptive. It does not rule out the possibility of   COVID-19 and should not be the sole basis for treatment decisions.   If COVID-19 is strongly suspected based on clinical and exposure   history, re-testing using an alternate molecular assay should be   considered.   This test is only for use under the Food and Drug   Administration s Emergency Use Authorization (EUA).   Commercial kits are provided by [employer].   Performance characteristics of the EUA have been independently   verified by [facility] Department of   Pathology and Laboratory Medicine.   _________________________________________________________________   The authorized Fact Sheet for Healthcare Providers and the authorized Fact   Sheet for Patients of the ID NOW COVID-19 are available on the FDA   website:     https://www.fda.gov/media/150659/download  https://www.fda.gov/media/530518/download           HCG, QUANTITATIVE    Narrative:     Release to patient->Immediate   URINALYSIS, REFLEX TO URINE CULTURE   GROUP & RH   TYPE  & SCREEN   PREPARE RH IMMUNE GLOBULIN         ALL LAB VALUES:  Results for orders placed or performed during the hospital encounter of 03/14/22   CBC auto differential   Result Value Ref Range    WBC 10.62 3.90 - 12.70 K/uL    RBC 4.47 4.00 - 5.40 M/uL    Hemoglobin 10.5 (L) 12.0 - 16.0 g/dL    Hematocrit 32.7 (L) 37.0 - 48.5 %    MCV 73 (L) 82 - 98 fL    MCH 23.5 (L) 27.0 - 31.0 pg    MCHC 32.1 32.0 - 36.0 g/dL    RDW 17.2 (H) 11.5 - 14.5 %    Platelets 309 150 - 450 K/uL    MPV 9.0 (L) 9.2 - 12.9 fL    Immature Granulocytes 0.4 0.0 - 0.5 %    Gran # (ANC) 9.1 (H) 1.8 - 7.7 K/uL    Immature Grans (Abs) 0.04 0.00 - 0.04 K/uL    Lymph # 1.2 1.0 - 4.8 K/uL    Mono # 0.3 0.3 - 1.0 K/uL    Eos # 0.0 0.0 - 0.5 K/uL    Baso # 0.02 0.00 - 0.20 K/uL    nRBC 0 0 /100 WBC    Gran % 85.2 (H) 38.0 - 73.0 %    Lymph % 11.0 (L) 18.0 - 48.0 %    Mono % 3.2 (L) 4.0 - 15.0 %    Eosinophil % 0.0 0.0 - 8.0 %    Basophil % 0.2 0.0 - 1.9 %    Differential Method Automated    Comprehensive metabolic panel   Result Value Ref Range    Sodium 140 136 - 145 mmol/L    Potassium 3.3 (L) 3.5 - 5.1 mmol/L    Chloride 104 95 - 110 mmol/L    CO2 28 23 - 29 mmol/L    Glucose 107 70 - 110 mg/dL    BUN 9 6 - 20 mg/dL    Creatinine 0.8 0.5 - 1.4 mg/dL    Calcium 9.3 8.7 - 10.5 mg/dL    Total Protein 7.8 6.0 - 8.4 g/dL    Albumin 4.1 3.5 - 5.2 g/dL    Total Bilirubin 0.4 0.1 - 1.0 mg/dL    Alkaline Phosphatase 61 55 - 135 U/L    AST 20 10 - 40 U/L    ALT 19 10 - 44 U/L    Anion Gap 8 8 - 16 mmol/L    eGFR if African American >60 >60 mL/min/1.73 m^2    eGFR if non African American >60 >60 mL/min/1.73 m^2   hCG, quantitative, pregnancy   Result Value Ref Range    HCG Quant 3925 See Text mIU/mL   POCT COVID-19 Rapid Screening   Result Value Ref Range    POC Rapid COVID Negative Negative     Acceptable Yes            RADIOLOGY STUDIES:  Imaging Results    None                   The above vital signs and test results have been reviewed by the  emergency provider.     ED Medications:  Medications   rho(D) immune globulin injection 300 mcg (has no administration in time range)   morphine injection 4 mg (4 mg Intravenous Given 3/14/22 1248)   ondansetron injection 4 mg (4 mg Intravenous Given 3/14/22 1246)       There are no discharge medications for this patient.    Discharge Medications:  New Prescriptions    No medications on file       2:10 PM: Discussed case with BJ Way (obgyn). Dr. Way  agrees with current care and management of pt and accepts admission.   Admitting Service: obgyn   Admitting Physician: los  Admit to: med surg  Dr. Way has come to the er and assessed pt on her own      MEDICAL DECISION MAKING                Attending Attestation:   Physician Attestation Statement for Resident:  As the supervising MD . -: Sent from OB clinic because of vaginal bleeding and suspected molar pregnancy   As the supervising MD I agree with the above PE.   -: Dr. Way will take to the OR for D&C                CLINICAL IMPRESSION       ICD-10-CM ICD-9-CM   1. Missed   O02.1 632   2. Encounter for assessment for suspected ectopic pregnancy  Z32.00 V72.40   3. Pelvic pain  R10.2 [id number]       Disposition:   Disposition: Placed in Observation  Condition: Fair         Issa Huang NP  22 1412       Kaleb Barber MD  22 141

## 2022-03-14 NOTE — PROGRESS NOTES
CHIEF COMPLAINT:   Patient presents with      Possible Pregnancy        HISTORY OF PRESENT ILLNESS  Haley Hammer 41 y.o.  presents for pregnancy risk assessment.   The patient complains of severe pain and bleeding today. Passing clots the size of her fist. Has had terrible cramping and bleeding for the past week. No longer having pregnancy symptoms (breast tenderness/fatigue).  No nausea or vomiting. Pregnancy was planned and is desired.  Partner is supportive of pregnancy.  Lives at home with  and other children. Denies domestic abuse.  Denies chemical/pesticide/radiation exposure.  OB history:      LMP: Patient's last menstrual period was 2022.  EDC: Estimated Date of Delivery: None noted.  EGA: Unknown       Health Maintenance   Topic Date Due    Hepatitis C Screening  Never done    TETANUS VACCINE  2024    Lipid Panel  Completed       Past Medical History:   Diagnosis Date    Abnormal Pap smear of vagina     Anemia     Back pain in pregnancy     Rh negative state in antepartum period-  Pt is AB - 2014    Sickle cell trait      does not carry sickle cell trait       Past Surgical History:   Procedure Laterality Date     SECTION      x3       Family History   Problem Relation Age of Onset    No Known Problems Mother     No Known Problems Father     Breast cancer Neg Hx     Ovarian cancer Neg Hx     Deep vein thrombosis Neg Hx     Colon cancer Neg Hx     Eczema Neg Hx     Lupus Neg Hx     Psoriasis Neg Hx        Social History     Socioeconomic History    Marital status:    Tobacco Use    Smoking status: Never Smoker    Smokeless tobacco: Never Used   Substance and Sexual Activity    Alcohol use: No    Drug use: No    Sexual activity: Not Currently     Partners: Male     Birth control/protection: Condom   Other Topics Concern    Are you pregnant or think you may be? No    Breast-feeding No       No current outpatient  medications on file.     No current facility-administered medications for this visit.       Review of patient's allergies indicates:  No Known Allergies      PHYSICAL EXAM   Vitals:    03/14/22 1044   BP: 102/60        PAIN SCALE: 0/10 None    PHYSICAL EXAM    ROS:  GENERAL: No fever, chills, fatigability or weight loss.  CV: Denies chest pain  PULM: Denies shortness of breath or wheezing.  ABDOMEN: Appetite fine. No weight loss. Denies diarrhea, abdominal pain, hematemesis or blood in stool.  URINARY: No flank pain, dysuria or hematuria.  REPRODUCTIVE: No abnormal vaginal bleeding.       PE:   APPEARANCE: Well nourished, well developed, in no acute distress  CHEST: Clear to auscultation bilaterally  CV: Regular rate and rhythm  BREASTS: Symmetrical, no skin changes or visible lesions. No palpable masses, nipple discharge or adenopathy bilaterally.  ABDOMEN: + for guarding and tenderness.  PELVIC:   US shows no GS or FP. Both ovaries visualized. Endometrium cannot be clearly visualized. Rt adnexae appears fluid filled. Patient declines transvag exam    UPT +    A/P:     Called report to Dr. ALEXSANDER Way. Sent to ER for f/u possible ectopic.

## 2022-03-14 NOTE — H&P
"[facility] - Emergency Dept.  Obstetrics & Gynecology  History & Physical    Patient Name: Haley Hammer  MRN: [id number]  Admission Date: 3/14/2022  Primary Care Provider: Desi Anderson MD    Subjective:     Chief Complaint/Reason for Admission: Vaginal bleeding and severe pelvic pain    History of Present Illness:  Patient is a 41 y.o.  who presented to clinic today for pregnancy risk assessment and referred to ED for concerns of severe abdominal pain and bleeding. She reports a positive pregnancy test several weeks ago. Noticed a small amount of blood 3 days ago and then yesterday began to have heavier bleeding like a menstrual cycle. She reports today onset of severe pelvic pain "never had pain like this before" along with passage of large fist like clot. She continues to have severe pain that is worse with lying supine. No nausea or vomiting. No fever          OB History    Para Term  AB Living   3 3 3 0 0 3   SAB IAB Ectopic Multiple Live Births   0 0 0 0 3      # Outcome Date GA Lbr Yannick/2nd Weight Sex Delivery Anes PTL Lv   3 Term 11/10/14 39w1d  3.189 kg (7 lb 0.5 oz) F CS-LTranv Spinal N DEBORAH      Apgar1: 9  Apgar5: 9   2 Term 13 40w0d  4.082 kg (9 lb) M CS-Unspec Spinal N DEBORAH   1 Term 04/18/10 40w0d  4.536 kg (10 lb) F CS-Unspec Spinal N DEBORAH     Past Medical History:   Diagnosis Date    Abnormal Pap smear of vagina     Anemia     Back pain in pregnancy     Rh negative state in antepartum period-  Pt is AB - 2014    Sickle cell trait      does not carry sickle cell trait     Past Surgical History:   Procedure Laterality Date     SECTION      x3       (Not in a hospital admission)      Review of patient's allergies indicates:  No Known Allergies     Family History       Problem Relation (Age of Onset)    No Known Problems Mother, Father          Tobacco Use    Smoking status: Never Smoker    Smokeless tobacco: Never Used   Substance and Sexual " Activity    Alcohol use: No    Drug use: No    Sexual activity: Not Currently     Partners: Male     Birth control/protection: Condom     Review of Systems   Objective:     Vital Signs (Most Recent):  Temp: 98.3 °F (36.8 °C) (03/14/22 1208)  Pulse: (!) 54 (03/14/22 1208)  Resp: 19 (03/14/22 1248)  BP: (!) 155/78 (03/14/22 1208)  SpO2: 98 % (03/14/22 1208)   Vital Signs (24h Range):  Temp:  [98.3 °F (36.8 °C)] 98.3 °F (36.8 °C)  Pulse:  [54] 54  Resp:  [18-19] 19  SpO2:  [98 %] 98 %  BP: (102-155)/(60-78) 155/78        There is no height or weight on file to calculate BMI.    No LMP recorded.    Physical Exam:   Constitutional: She is oriented to person, place, and time. She appears well-developed and well-nourished. No distress.       Cardiovascular:  Normal rate, regular rhythm and normal heart sounds.            No murmur heard.   Pulmonary/Chest: Effort normal and breath sounds normal. No respiratory distress. She has no wheezes. She has no rales.        Abdominal: Soft. Bowel sounds are normal. She exhibits no distension. There is abdominal tenderness. There is guarding.     Genitourinary:    Genitourinary Comments: Large amount of blood on bimanual exam             Musculoskeletal: No edema.       Neurological: She is alert and oriented to person, place, and time.    Skin: Skin is warm and dry. No rash noted. She is not diaphoretic.      Laboratory:  CBC:   Recent Labs   Lab 03/14/22  1242   WBC 10.62   RBC 4.47   HGB 10.5*   HCT 32.7*      MCV 73*   MCH 23.5*   MCHC 32.1       Diagnostic Results:  Labs: Reviewed  US: Reviewed    Assessment/Plan:     Missed ab  Large amount of inta uterine blood on ultrasound  -plan for urgent suction D&C followed by diagnostic laparoscopy    Encounter for assessment for suspected ectopic pregnancy  Concern for possible ruptured ectopic due to severe pelvic pain. While ultrasound findings not conclusive she does have a fluid filled tube and ectopic must be strongly  suspected  -plan for diagnostic laparosocpy to evaluate      Iron deficiency anemia  Patient seeing Heme/Onc for iron deficiency anemia  -acute blood loss on chronic anemia, currently H/H is stable  -will need to continue iron repletion  -Type and screen and continue to trend H/H        Evangelina Malin PA-C  Obstetrics & Gynecology  [facility] - Emergency Dept.

## 2022-03-14 NOTE — TRANSFER OF CARE
Anesthesia Transfer of Care Note    Patient: Haley Hammer    Procedure(s) Performed: Procedure(s) (LRB):  DILATION AND CURETTAGE, UTERUS, USING SUCTION (N/A)  SALPINGECTOMY, LAPAROSCOPIC (Left)  REMOVAL, ECTOPIC PREGNANCY, LAPAROSCOPIC (Left)    Patient location: PACU    Anesthesia Type: general    Transport from OR: Transported from OR on room air with adequate spontaneous ventilation    Post pain: adequate analgesia    Post assessment: no apparent anesthetic complications    Post vital signs: stable    Level of consciousness: awake    Nausea/Vomiting: no nausea/vomiting    Complications: none    Transfer of care protocol was followed      Last vitals:   Visit Vitals  BP (!) 155/78   Pulse (!) 54   Temp 36.8 °C (98.3 °F) (Oral)   Resp 19   LMP 01/12/2022 (Approximate)   SpO2 98%   Breastfeeding No

## 2022-03-14 NOTE — ASSESSMENT & PLAN NOTE
Concern for possible ruptured ectopic due to severe pelvic pain. While ultrasound findings not conclusive she does have a fluid filled tube and ectopic must be strongly suspected  -plan for diagnostic laparosocpy to evaluate

## 2022-03-17 ENCOUNTER — TELEPHONE (OUTPATIENT)
Dept: OBSTETRICS AND GYNECOLOGY | Facility: CLINIC | Age: 42
End: 2022-03-17
Payer: OTHER GOVERNMENT

## 2022-03-17 NOTE — TELEPHONE ENCOUNTER
----- Message from Love Ricardo sent at 3/17/2022  7:57 AM CDT -----  Regarding: appt  Contact: pt  Type:  Sooner Appointment Request    Caller is requesting a sooner appointment.  Caller declined first available appointment listed below.  Caller will not accept being placed on the waitlist and is requesting a message be sent to doctor.  Name of Caller: pt  When is the first available appointment? 04/20  Symptoms: f/u  Would the patient rather a call back or a response via [facility]? Call back  Best Call Back Number: [phone]  Additional Information: n/a

## 2022-03-21 ENCOUNTER — PATIENT MESSAGE (OUTPATIENT)
Dept: OBSTETRICS AND GYNECOLOGY | Facility: CLINIC | Age: 42
End: 2022-03-21
Payer: OTHER GOVERNMENT

## 2022-03-21 DIAGNOSIS — R42 DIZZINESS: ICD-10-CM

## 2022-03-21 DIAGNOSIS — O02.0 MOLAR PREGNANCY: Primary | ICD-10-CM
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
## 2022-03-21 NOTE — TELEPHONE ENCOUNTER
----- Message from Christal Joshua sent at 3/21/2022  3:12 PM CDT -----  Contact: [phone]  Please call patient about her lab results, she have some questions.

## 2022-03-22 LAB
FINAL PATHOLOGIC DIAGNOSIS: NORMAL
GROSS: NORMAL
Lab: NORMAL
SUPPLEMENTAL DIAGNOSIS: NORMAL

## 2022-03-23 ENCOUNTER — LAB VISIT (OUTPATIENT)
Dept: LAB | Facility: HOSPITAL | Age: 42
End: 2022-03-23
Attending: OBSTETRICS & GYNECOLOGY
Payer: OTHER GOVERNMENT

## 2022-03-23 DIAGNOSIS — R42 DIZZINESS: ICD-10-CM

## 2022-03-23 DIAGNOSIS — O02.0 MOLAR PREGNANCY: ICD-10-CM

## 2022-03-23 LAB
BASOPHILS # BLD AUTO: 0.03 K/UL (ref 0–0.2)
BASOPHILS NFR BLD: 0.5 % (ref 0–1.9)
DIFFERENTIAL METHOD: ABNORMAL
EOSINOPHIL # BLD AUTO: 0.1 K/UL (ref 0–0.5)
EOSINOPHIL NFR BLD: 1.8 % (ref 0–8)
ERYTHROCYTE [DISTWIDTH] IN BLOOD BY AUTOMATED COUNT: 19.4 % (ref 11.5–14.5)
HCG INTACT+B SERPL-ACNC: 75 MIU/ML
HCT VFR BLD AUTO: 33.6 % (ref 37–48.5)
HGB BLD-MCNC: 10 G/DL (ref 12–16)
IMM GRANULOCYTES # BLD AUTO: 0.02 K/UL (ref 0–0.04)
IMM GRANULOCYTES NFR BLD AUTO: 0.4 % (ref 0–0.5)
LYMPHOCYTES # BLD AUTO: 2 K/UL (ref 1–4.8)
LYMPHOCYTES NFR BLD: 35.7 % (ref 18–48)
MCH RBC QN AUTO: 23.9 PG (ref 27–31)
MCHC RBC AUTO-ENTMCNC: 29.8 G/DL (ref 32–36)
MCV RBC AUTO: 80 FL (ref 82–98)
MONOCYTES # BLD AUTO: 0.3 K/UL (ref 0.3–1)
MONOCYTES NFR BLD: 4.6 % (ref 4–15)
NEUTROPHILS # BLD AUTO: 3.3 K/UL (ref 1.8–7.7)
NEUTROPHILS NFR BLD: 57 % (ref 38–73)
NRBC BLD-RTO: 0 /100 WBC
PLATELET # BLD AUTO: 368 K/UL (ref 150–450)
PMV BLD AUTO: 10.7 FL (ref 9.2–12.9)
RBC # BLD AUTO: 4.19 M/UL (ref 4–5.4)
WBC # BLD AUTO: 5.71 K/UL (ref 3.9–12.7)

## 2022-03-23 PROCEDURE — 85025 COMPLETE CBC W/AUTO DIFF WBC: CPT | Performed by: OBSTETRICS & GYNECOLOGY

## 2022-03-23 PROCEDURE — 84702 CHORIONIC GONADOTROPIN TEST: CPT | Performed by: OBSTETRICS & GYNECOLOGY

## 2022-03-23 PROCEDURE — 36415 COLL VENOUS BLD VENIPUNCTURE: CPT | Performed by: OBSTETRICS & GYNECOLOGY

## 2022-03-24 ENCOUNTER — TELEPHONE (OUTPATIENT)
Dept: OBSTETRICS AND GYNECOLOGY | Facility: HOSPITAL | Age: 42
End: 2022-03-24
Payer: OTHER GOVERNMENT

## 2022-03-24 DIAGNOSIS — O02.0 MOLAR PREGNANCY: Primary | ICD-10-CM

## 2022-03-24 NOTE — TELEPHONE ENCOUNTER
Hemoglobin and hematocrit are stable.  Needs to see her PCP for dizziness if that is still present.  Beta hcg down to 75.  Needs repeat beta hcg in 1 week.  Please schedule.
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
## 2022-03-31 ENCOUNTER — OFFICE VISIT (OUTPATIENT)
Dept: INTERNAL MEDICINE | Facility: CLINIC | Age: 42
End: 2022-03-31
Payer: OTHER GOVERNMENT

## 2022-03-31 ENCOUNTER — LAB VISIT (OUTPATIENT)
Dept: LAB | Facility: HOSPITAL | Age: 42
End: 2022-03-31
Attending: OBSTETRICS & GYNECOLOGY
Payer: OTHER GOVERNMENT

## 2022-03-31 DIAGNOSIS — O02.0 MOLAR PREGNANCY: ICD-10-CM

## 2022-03-31 DIAGNOSIS — M47.817 LUMBOSACRAL SPONDYLOSIS WITHOUT MYELOPATHY: Primary | ICD-10-CM

## 2022-03-31 PROCEDURE — 84702 CHORIONIC GONADOTROPIN TEST: CPT | Performed by: OBSTETRICS & GYNECOLOGY

## 2022-03-31 PROCEDURE — 99214 PR OFFICE/OUTPT VISIT, EST, LEVL IV, 30-39 MIN: ICD-10-PCS | Mod: 95,,, | Performed by: NURSE PRACTITIONER

## 2022-03-31 PROCEDURE — 99214 OFFICE O/P EST MOD 30 MIN: CPT | Mod: 95,,, | Performed by: NURSE PRACTITIONER

## 2022-03-31 PROCEDURE — 36415 COLL VENOUS BLD VENIPUNCTURE: CPT | Performed by: OBSTETRICS & GYNECOLOGY

## 2022-03-31 RX ORDER — TIZANIDINE 2 MG/1
2 TABLET ORAL NIGHTLY PRN
Qty: 14 TABLET | Refills: 0 | Status: SHIPPED | OUTPATIENT
Start: 2022-03-31 | End: 2022-04-10

## 2022-03-31 RX ORDER — KETOROLAC TROMETHAMINE 10 MG/1
10 TABLET, FILM COATED ORAL EVERY 6 HOURS
Qty: 20 TABLET | Refills: 0 | Status: SHIPPED | OUTPATIENT
Start: 2022-03-31 | End: 2022-04-05

## 2022-04-01 ENCOUNTER — TELEPHONE (OUTPATIENT)
Dept: INTERNAL MEDICINE | Facility: CLINIC | Age: 42
End: 2022-04-01
Payer: OTHER GOVERNMENT

## 2022-04-01 ENCOUNTER — HOSPITAL ENCOUNTER (OUTPATIENT)
Dept: RADIOLOGY | Facility: HOSPITAL | Age: 42
Discharge: HOME | End: 2022-04-01
Attending: NURSE PRACTITIONER
Payer: OTHER GOVERNMENT

## 2022-04-01 DIAGNOSIS — M47.817 LUMBOSACRAL SPONDYLOSIS WITHOUT MYELOPATHY: ICD-10-CM

## 2022-04-01 LAB — HCG INTACT+B SERPL-ACNC: 12 MIU/ML

## 2022-04-01 PROCEDURE — 72100 XR LUMBAR SPINE AP AND LATERAL: ICD-10-PCS | Mod: 26,,, | Performed by: RADIOLOGY

## 2022-04-01 PROCEDURE — 72100 X-RAY EXAM L-S SPINE 2/3 VWS: CPT | Mod: 26,,, | Performed by: RADIOLOGY

## 2022-04-01 PROCEDURE — 72100 X-RAY EXAM L-S SPINE 2/3 VWS: CPT | Mod: TC

## 2022-04-01 NOTE — TELEPHONE ENCOUNTER
----- Message from Jinny Sharpe NP sent at 4/1/2022  1:06 PM CDT -----  Xray of back shows no acute findings  Take meds as ordered, see specialist

## 2022-04-01 NOTE — PROGRESS NOTES
Subjective:       Patient ID: Haley Hammer is a 41 y.o. female.    Chief Complaint: low back pain  HPI    Pt here w/ above complaint. She reports long hx of back pain, but worse than usual  Back feels tight  Recent miscarriage.  Reports pain 10/10  Tried norco, too strong makes her dizzy ,. Tylenol/ibuprofen not working    Past Medical History:   Diagnosis Date    Abnormal Pap smear of vagina     Anemia     Back pain in pregnancy     Rh negative state in antepartum period-  Pt is AB - 2014    Sickle cell trait      does not carry sickle cell trait     Past Surgical History:   Procedure Laterality Date     SECTION      x3    DILATION AND CURETTAGE OF UTERUS USING SUCTION N/A 3/14/2022    Procedure: DILATION AND CURETTAGE, UTERUS, USING SUCTION;  Surgeon: Angely Way MD;  Location: [facility] OR;  Service: OB/GYN;  Laterality: N/A;    LAPAROSCOPIC SALPINGECTOMY Left 3/14/2022    Procedure: SALPINGECTOMY, LAPAROSCOPIC;  Surgeon: Angely Way MD;  Location: [facility] OR;  Service: OB/GYN;  Laterality: Left;    LAPAROSCOPIC TREATMENT OF ECTOPIC PREGNANCY Left 3/14/2022    Procedure: REMOVAL, ECTOPIC PREGNANCY, LAPAROSCOPIC;  Surgeon: Angely Way MD;  Location: [facility] OR;  Service: OB/GYN;  Laterality: Left;     Social History     Socioeconomic History    Marital status:    Tobacco Use    Smoking status: Never Smoker    Smokeless tobacco: Never Used   Substance and Sexual Activity    Alcohol use: No    Drug use: No    Sexual activity: Not Currently     Partners: Male     Birth control/protection: Condom   Other Topics Concern    Are you pregnant or think you may be? No    Breast-feeding No     Review of patient's allergies indicates:  No Known Allergies  Current Outpatient Medications   Medication Sig    ferrous sulfate (FEOSOL) 325 mg (65 mg iron) Tab tablet Take 325 mg by mouth once daily.    HYDROcodone-acetaminophen (NORCO) 5-325 mg per tablet Take 1 tablet by mouth  every 6 (six) hours as needed for Pain.    ibuprofen (ADVIL,MOTRIN) 600 MG tablet Take 1 tablet (600 mg total) by mouth every 8 (eight) hours as needed for Pain.    ketorolac (TORADOL) 10 mg tablet Take 1 tablet (10 mg total) by mouth every 6 (six) hours. for 5 days    tiZANidine (ZANAFLEX) 2 MG tablet Take 1 tablet (2 mg total) by mouth nightly as needed (muscle spasm).     No current facility-administered medications for this visit.           Review of Systems   Constitutional: Negative for activity change, appetite change, chills, diaphoresis, fatigue and unexpected weight change.   HENT: Negative for congestion, ear pain, postnasal drip, rhinorrhea, sinus pressure, sinus pain, sneezing, sore throat, tinnitus, trouble swallowing and voice change.    Eyes: Negative for photophobia, pain and visual disturbance.   Respiratory: Negative for cough, chest tightness, shortness of breath and wheezing.    Cardiovascular: Negative for palpitations and leg swelling.   Gastrointestinal: Negative for abdominal distention, abdominal pain, constipation, diarrhea, nausea and vomiting.   Genitourinary: Negative for decreased urine volume, difficulty urinating, dysuria, flank pain, frequency, hematuria, pelvic pain and urgency.   Musculoskeletal: Positive for back pain. Negative for arthralgias, joint swelling, neck pain and neck stiffness.   Allergic/Immunologic: Negative for immunocompromised state.   Neurological: Negative for dizziness, tremors, seizures, syncope, facial asymmetry, speech difficulty, weakness, light-headedness and numbness.   Hematological: Negative for adenopathy. Does not bruise/bleed easily.   Psychiatric/Behavioral: Negative for confusion and sleep disturbance.       Objective:      Physical Exam  Neurological:      Mental Status: She is alert and oriented to person, place, and time.         Assessment:   There were no vitals filed for this visit.      1. Lumbosacral spondylosis without myelopathy         Plan:   Lumbosacral spondylosis without myelopathy  -     ketorolac (TORADOL) 10 mg tablet; Take 1 tablet (10 mg total) by mouth every 6 (six) hours. for 5 days  Dispense: 20 tablet; Refill: 0  -     tiZANidine (ZANAFLEX) 2 MG tablet; Take 1 tablet (2 mg total) by mouth nightly as needed (muscle spasm).  Dispense: 14 tablet; Refill: 0  -     X-Ray Lumbar Spine Ap And Lateral; Future; Expected date: 03/31/2022  -     Ambulatory referral/consult to Pain Clinic; Future; Expected date: 04/07/2022        As above  Meds/SE discussed    Answers for HPI/ROS submitted by the patient on 3/28/2022  genital pain: No

## 2022-04-04 ENCOUNTER — TELEPHONE (OUTPATIENT)
Dept: OBSTETRICS AND GYNECOLOGY | Facility: CLINIC | Age: 42
End: 2022-04-04
Payer: OTHER GOVERNMENT

## 2022-04-04 DIAGNOSIS — O02.0 MOLAR PREGNANCY: Primary | ICD-10-CM

## 2022-04-04 NOTE — TELEPHONE ENCOUNTER
----- Message from Blossom Fried sent at 4/4/2022  4:43 PM CDT -----  Pt stated she was returning a call. Please call her back at [phone]. Thx. EL

## 2022-04-04 NOTE — TELEPHONE ENCOUNTER
Beta hcg down to 12.  Dropping appropriately.  Needs repeat beta hcg in 1 week.  Please notify and schedule.
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
## 2022-04-04 NOTE — TELEPHONE ENCOUNTER
Attempted to call patient, no answer, left message to call office.    Lab booked for [place] on 4/11

## 2022-04-04 NOTE — TELEPHONE ENCOUNTER
Spoke with patient. Two pt identifiers confirmed. Notified patient of results. Patient verbalized understanding.  scheduled labs
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
## 2022-04-12 ENCOUNTER — TELEPHONE (OUTPATIENT)
Dept: PAIN MEDICINE | Facility: CLINIC | Age: 42
End: 2022-04-12
Payer: OTHER GOVERNMENT

## 2022-04-12 NOTE — TELEPHONE ENCOUNTER
Called patient to confirm appointment . Patent Answered and did confirmed.     Juan Luis Meza  Medical Assistant
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
## 2022-04-13 ENCOUNTER — OFFICE VISIT (OUTPATIENT)
Dept: PAIN MEDICINE | Facility: CLINIC | Age: 42
End: 2022-04-13
Payer: OTHER GOVERNMENT

## 2022-04-13 VITALS
WEIGHT: 162.69 LBS | DIASTOLIC BLOOD PRESSURE: 100 MMHG | SYSTOLIC BLOOD PRESSURE: 176 MMHG | HEART RATE: 76 BPM | HEIGHT: 65 IN | BODY MASS INDEX: 27.1 KG/M2

## 2022-04-13 DIAGNOSIS — M70.61 GREATER TROCHANTERIC BURSITIS OF BOTH HIPS: ICD-10-CM

## 2022-04-13 DIAGNOSIS — M47.817 LUMBOSACRAL SPONDYLOSIS WITHOUT MYELOPATHY: ICD-10-CM

## 2022-04-13 DIAGNOSIS — M79.18 MYOFASCIAL PAIN: ICD-10-CM

## 2022-04-13 DIAGNOSIS — M46.1 SACROILIITIS: Primary | ICD-10-CM

## 2022-04-13 DIAGNOSIS — M70.62 GREATER TROCHANTERIC BURSITIS OF BOTH HIPS: ICD-10-CM

## 2022-04-13 DIAGNOSIS — M54.9 DORSALGIA, UNSPECIFIED: ICD-10-CM

## 2022-04-13 PROCEDURE — 99204 OFFICE O/P NEW MOD 45 MIN: CPT | Mod: S$PBB,,, | Performed by: ANESTHESIOLOGY

## 2022-04-13 PROCEDURE — 99215 OFFICE O/P EST HI 40 MIN: CPT | Mod: PBBFAC | Performed by: ANESTHESIOLOGY

## 2022-04-13 PROCEDURE — 99999 PR PBB SHADOW E&M-EST. PATIENT-LVL V: ICD-10-PCS | Mod: PBBFAC,,, | Performed by: ANESTHESIOLOGY

## 2022-04-13 PROCEDURE — 99999 PR PBB SHADOW E&M-EST. PATIENT-LVL V: CPT | Mod: PBBFAC,,, | Performed by: ANESTHESIOLOGY

## 2022-04-13 PROCEDURE — 99204 PR OFFICE/OUTPT VISIT, NEW, LEVL IV, 45-59 MIN: ICD-10-PCS | Mod: S$PBB,,, | Performed by: ANESTHESIOLOGY

## 2022-04-13 NOTE — PATIENT INSTRUCTIONS
General Neck and Back Pain    Both neck and back pain are usually caused by injury to the muscles or ligaments of the spine. Sometimes the disks that separate each bone of the spine may cause pain by pressing on a nearby nerve. Back and neck pain may appear after a sudden twisting or bending force (such as in a car accident), or sometimes after a simple awkward movement. In either case, muscle spasm is often present and adds to the pain.  Acute neck and back pain usually gets better in 1 to 2 weeks. Pain related to disk disease, arthritis in the spinal joints or spinal stenosis (narrowing of the spinal canal) can become chronic and last for months or years.  Back and neck pain are common problems. Most people feel better in 1 or 2 weeks, and most of the rest in 1 to 2 months. Most people can remain active.  People experience and describe pain differently.  Pain can be sharp, stabbing, shooting, aching, cramping, or burning  Movement, standing, bending, lifting, sitting, or walking may worsen the pain  Pain can be localized to one spot or area, or it can be more generalized  Pain can spread or radiate upwards, downwards, to the front, or go down your arms  Muscle spasm may occur.  Most of the time mechanical problems with the muscles or spine cause the pain. it is usually caused by an injury, whether known or not, to the muscles or ligaments. While illnesses can cause back pain, it is usually not caused by a serious illness. Pain is usually related to physical activity, whether sports, exercise, work, or normal activity. Sometimes it can occur without an identifiable cause. This can happen simply by stretching or moving wrong, without noting pain at the time. Other causes include:  Overexertion, lifting, pushing, pulling incorrectly or too aggressively.  Sudden twisting, bending or stretching from an accident (car or fall), or accidental movement.  Poor posture  Poor conditioning, lack of regular exercise  Spinal  disc disease or arthritis  Stress  Pregnancy, or illness like appendicitis, bladder or kidney infection, pelvic infections   Home care  For neck pain: Use a comfortable pillow that supports the head and keeps the spine in a neutral position. The position of the head should not be tilted forward or backward.  When in bed, try to find a position of comfort. A firm mattress is best. Try lying flat on your back with pillows under your knees. You can also try lying on your side with your knees bent up towards your chest and a pillow between your knees.  At first, do not try to stretch out the sore spots. If there is a strain, it is not like the good soreness you get after exercising without an injury. In this case, stretching may make it worse.  Avoid prolonged sitting, long car rides or travel. This puts more stress on the lower back than standing or walking.  During the first 24 to 72 hours after an injury, apply an ice pack to the painful area for 20 minutes and then remove it for 20 minutes over a period of 60 to 90 minutes or several times a day.   You can alternate ice and heat therapies. Talk with your healthcare provider about the best treatment for your back or neck pain. As a safety precaution, do not use a heating pad at bedtime. Sleeping with a heating pad can lead to skin burns or tissue damage.  Therapeutic massage can help relax the back and neck muscles without stretching them.  Be aware of safe lifting methods and do not lift anything over 15 pounds until all the pain is gone.  Medications  Talk to your healthcare provider before using medicine, especially if you have other medical problems or are taking other medicines.  You may use over-the-counter medicine to control pain, unless another pain medicine was prescribed. If you have chronic conditions like diabetes, liver or kidney disease, stomach ulcers,  gastrointestinal bleeding, or are taking blood thinner medicines.  Be careful if you are given pain  medicines, narcotics, or medicine for muscle spasm. They can cause drowsiness, and can affect your coordination, reflexes, and judgment. Do not drive or operate heavy machinery.  Follow-up care  Follow up with your healthcare provider, or as advised. Physical therapy or further tests may be needed.  If X-rays were taken, you will be notified of any new findings that may affect your care.  Call 911  Seek emergency medical care if any of the following occur:  Trouble breathing  Confusion  Very drowsy or trouble awakening  Fainting or loss of consciousness  Rapid or very slow heart rate  Loss of bowel or bladder control  When to seek medical advice  Call your healthcare provider right away if any of these occur:  Pain becomes worse or spreads into your arms or legs  Weakness, numbness or pain in one or both arms or legs  Numbness in the groin area  Difficulty walking  Fever of 100.4ºF (38ºC) or higher, or as directed by your healthcare provider  Date Last Reviewed: 7/1/2016  © 3703-2980 [employer]. [address] [place] [address]. All rights reserved. This information is not intended as a substitute for professional medical care. Always follow your healthcare professional's instructions.       Exercises to Strengthen Your Lower Back  Strong lower back and abdominal muscles work together to support your spine. The exercises below will help strengthen the lower back. It is important that you begin exercising slowly and increase levels gradually.  Always begin any exercise program with stretching. If you feel pain while doing any of these exercises, stop and talk to your doctor about a more specific exercise program that better suits your condition.   Low back stretch  The point of stretching is to make you more flexible and increase your range of motion. Stretch only as much as you are able. Stretch slowly. Do not push your stretch to the limit. If at any point you feel pain while stretching,  this is your (temporary) limit.  Lie on your back with your knees bent and both feet on the ground.  Slowly raise your left knee to your chest as you flatten your lower back against the floor. Hold for 5 seconds.  Relax and repeat the exercise with your right knee.  Do 10 of these exercises for each leg.  Repeat hugging both knees to your chest at the same time.  Building lower back strength  Start your exercise routine with 10 to 30 minutes a day, 1 to 3 times a day.  Initial exercises  Lying on your back:  Ankle pumps: Move your foot up and down, towards your head, and then away. Repeat 10 times with each foot.  Heel slides: Slowly bend your knee, drawing the heel of your foot towards you. Then slide your heel/foot from you, straightening your knee. Do not lift your foot off the floor (this is not a leg lift).  Abdominal contraction: Bend your knees and put your hands on your stomach. Tighten your stomach muscles. Hold for 5 seconds, then relax. Repeat 10 times.  Straight leg raise: Bend one leg at the knee and keep the other leg straight. Tighten your stomach muscles. Slowly lift your straight leg 6 to 12 inches off the floor and hold for up to 5 seconds. Repeat 10 times on each side.  Standing:  Wall squats: Stand with your back against the wall. Move your feet about 12 inches away from the wall. Tighten your stomach muscles, and slowly bend your knees until they are at about a 45 degree angle. Do not go down too far. Hold about 5 seconds. Then slowly return to your starting position. Repeat 10 times.  Heel raises: Stand facing the wall. Slowly raise the heels of your feet up and down, while keeping your toes on the floor. If you have trouble balancing, you can touch the wall with your hands. Repeat 10 times.  More advanced exercises  When you feel comfortable enough, try these exercises.  Kneeling lumbar extension: Begin on your hands and knees. At the same time, raise and straighten your right arm and left leg  until they are parallel to the ground. Hold for 2 seconds and come back slowly to a starting position. Repeat with left arm and right leg, alternating 10 times.  Prone lumbar extension: Lie face down, arms extended overhead, palms on the floor. At the same time, raise your right arm and left leg as high as comfortably possible. Hold for 10 seconds and slowly return to start. Repeat with left arm and right leg, alternating 10 times. Gradually build up to 20 times. (Advanced: Repeat this exercise raising both arms and both legs a few inches off the floor at the same time. Hold for 5 seconds and release.)  Pelvic tilt: Lie on the floor on your back with your knees bent at 90 degrees. Your feet should be flat on the floor. Inhale, exhale, then slowly contract your abdominal muscles bringing your navel toward your spine. Let your pelvis rock back until your lower back is flat on the floor. Hold for 10 seconds while breathing smoothly.  Abdominal crunch: Perform a pelvic tilt (above) flattening your lower back against the floor. Holding the tension in your abdominal muscles, take another breath and raise your shoulder blades off the ground (this is not a full sit-up). Keep your head in line with your body (dont bend your neck forward). Hold for 2 seconds, then slowly lower.  Date Last Reviewed: 6/1/2016  © 1124-5449 [employer]. [address] [place] [address], [place] [address]. All rights reserved. This information is not intended as a substitute for professional medical care. Always follow your healthcare professional's instructions.

## 2022-04-25 ENCOUNTER — OFFICE VISIT (OUTPATIENT)
Dept: OBSTETRICS AND GYNECOLOGY | Facility: CLINIC | Age: 42
End: 2022-04-25
Payer: OTHER GOVERNMENT

## 2022-04-25 ENCOUNTER — LAB VISIT (OUTPATIENT)
Dept: LAB | Facility: HOSPITAL | Age: 42
End: 2022-04-25
Attending: INTERNAL MEDICINE
Payer: OTHER GOVERNMENT

## 2022-04-25 VITALS
WEIGHT: 161.81 LBS | BODY MASS INDEX: 26.96 KG/M2 | DIASTOLIC BLOOD PRESSURE: 112 MMHG | HEIGHT: 65 IN | SYSTOLIC BLOOD PRESSURE: 187 MMHG

## 2022-04-25 DIAGNOSIS — O02.0 MOLAR PREGNANCY: ICD-10-CM

## 2022-04-25 DIAGNOSIS — Z09 STATUS POST GYNECOLOGICAL SURGERY, FOLLOW-UP EXAM: Primary | ICD-10-CM

## 2022-04-25 PROBLEM — O02.1 MISSED AB: Status: RESOLVED | Noted: 2022-03-14 | Resolved: 2022-04-25

## 2022-04-25 LAB — HCG INTACT+B SERPL-ACNC: <2.4 MIU/ML

## 2022-04-25 PROCEDURE — 99999 PR PBB SHADOW E&M-EST. PATIENT-LVL III: ICD-10-PCS | Mod: PBBFAC,,, | Performed by: OBSTETRICS & GYNECOLOGY

## 2022-04-25 PROCEDURE — 36415 COLL VENOUS BLD VENIPUNCTURE: CPT | Performed by: OBSTETRICS & GYNECOLOGY

## 2022-04-25 PROCEDURE — 99213 OFFICE O/P EST LOW 20 MIN: CPT | Mod: PBBFAC | Performed by: OBSTETRICS & GYNECOLOGY

## 2022-04-25 PROCEDURE — 99024 POSTOP FOLLOW-UP VISIT: CPT | Mod: ,,, | Performed by: OBSTETRICS & GYNECOLOGY

## 2022-04-25 PROCEDURE — 99999 PR PBB SHADOW E&M-EST. PATIENT-LVL III: CPT | Mod: PBBFAC,,, | Performed by: OBSTETRICS & GYNECOLOGY

## 2022-04-25 PROCEDURE — 84702 CHORIONIC GONADOTROPIN TEST: CPT | Performed by: OBSTETRICS & GYNECOLOGY

## 2022-04-25 PROCEDURE — 99024 PR POST-OP FOLLOW-UP VISIT: ICD-10-PCS | Mod: ,,, | Performed by: OBSTETRICS & GYNECOLOGY
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
## 2022-04-26 ENCOUNTER — PATIENT MESSAGE (OUTPATIENT)
Dept: OBSTETRICS AND GYNECOLOGY | Facility: CLINIC | Age: 42
End: 2022-04-26
Payer: OTHER GOVERNMENT

## 2022-04-27 ENCOUNTER — CLINICAL SUPPORT (OUTPATIENT)
Dept: REHABILITATION | Facility: HOSPITAL | Age: 42
End: 2022-04-27
Attending: ANESTHESIOLOGY
Payer: OTHER GOVERNMENT

## 2022-04-27 ENCOUNTER — TELEPHONE (OUTPATIENT)
Dept: INTERNAL MEDICINE | Facility: CLINIC | Age: 42
End: 2022-04-27
Payer: OTHER GOVERNMENT

## 2022-04-27 DIAGNOSIS — R53.1 DECREASED STRENGTH, ENDURANCE, AND MOBILITY: ICD-10-CM

## 2022-04-27 DIAGNOSIS — R68.89 DECREASED STRENGTH, ENDURANCE, AND MOBILITY: ICD-10-CM

## 2022-04-27 DIAGNOSIS — M79.18 MYOFASCIAL PAIN: ICD-10-CM

## 2022-04-27 DIAGNOSIS — M25.551 BILATERAL HIP PAIN: Primary | ICD-10-CM

## 2022-04-27 DIAGNOSIS — G89.29 CHRONIC MIDLINE LOW BACK PAIN WITHOUT SCIATICA: ICD-10-CM

## 2022-04-27 DIAGNOSIS — M25.552 BILATERAL HIP PAIN: Primary | ICD-10-CM

## 2022-04-27 DIAGNOSIS — M70.62 GREATER TROCHANTERIC BURSITIS OF BOTH HIPS: ICD-10-CM

## 2022-04-27 DIAGNOSIS — M54.9 DORSALGIA, UNSPECIFIED: ICD-10-CM

## 2022-04-27 DIAGNOSIS — Z74.09 DECREASED STRENGTH, ENDURANCE, AND MOBILITY: ICD-10-CM

## 2022-04-27 DIAGNOSIS — M70.61 GREATER TROCHANTERIC BURSITIS OF BOTH HIPS: ICD-10-CM

## 2022-04-27 DIAGNOSIS — M54.50 CHRONIC MIDLINE LOW BACK PAIN WITHOUT SCIATICA: ICD-10-CM

## 2022-04-27 PROCEDURE — 97161 PT EVAL LOW COMPLEX 20 MIN: CPT | Performed by: PHYSICAL THERAPIST

## 2022-04-27 NOTE — TELEPHONE ENCOUNTER
----- Message from Jinny Sharpe NP sent at 4/27/2022 11:41 AM CDT -----  Regarding: FW: Blood pressure  Reach out to this pt to see if she would like to schedule follow up to address her BP  ----- Message -----  From: Angely Way MD  Sent: 4/25/2022   1:09 PM CDT  To: Jinny Sharpe NP  Subject: Blood pressure                                   Pt saw me today for her post-op visit.  She's doing well, but BP running pretty high 170's/110's, completely asymptomatic.  I told her I would reach out to you guys to help arrange follow-up for this.  Thank you,  Dr. Way
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
## 2022-05-03 ENCOUNTER — CLINICAL SUPPORT (OUTPATIENT)
Dept: REHABILITATION | Facility: HOSPITAL | Age: 42
End: 2022-05-03
Payer: OTHER GOVERNMENT

## 2022-05-03 DIAGNOSIS — R68.89 DECREASED STRENGTH, ENDURANCE, AND MOBILITY: ICD-10-CM

## 2022-05-03 DIAGNOSIS — G89.29 CHRONIC MIDLINE LOW BACK PAIN WITHOUT SCIATICA: ICD-10-CM

## 2022-05-03 DIAGNOSIS — R53.1 DECREASED STRENGTH, ENDURANCE, AND MOBILITY: ICD-10-CM

## 2022-05-03 DIAGNOSIS — Z74.09 DECREASED STRENGTH, ENDURANCE, AND MOBILITY: ICD-10-CM

## 2022-05-03 DIAGNOSIS — M54.9 DORSALGIA, UNSPECIFIED: ICD-10-CM

## 2022-05-03 DIAGNOSIS — M54.50 CHRONIC MIDLINE LOW BACK PAIN WITHOUT SCIATICA: ICD-10-CM

## 2022-05-03 DIAGNOSIS — M79.18 MYOFASCIAL PAIN: ICD-10-CM

## 2022-05-03 DIAGNOSIS — M70.62 GREATER TROCHANTERIC BURSITIS OF BOTH HIPS: Primary | ICD-10-CM

## 2022-05-03 DIAGNOSIS — M25.552 BILATERAL HIP PAIN: ICD-10-CM

## 2022-05-03 DIAGNOSIS — M70.61 GREATER TROCHANTERIC BURSITIS OF BOTH HIPS: Primary | ICD-10-CM

## 2022-05-03 DIAGNOSIS — M25.551 BILATERAL HIP PAIN: ICD-10-CM

## 2022-05-03 PROCEDURE — 97110 THERAPEUTIC EXERCISES: CPT | Performed by: PHYSICAL THERAPIST

## 2022-05-03 NOTE — PROGRESS NOTES
[facility] OUTPATIENT THERAPY AND WELLNESS   Physical Therapy Treatment Note     Name: Haley Hammer  Clinic Number: [id number]    Therapy Diagnosis:   Encounter Diagnoses   Name Primary?    Greater trochanteric bursitis of both hips Yes    Dorsalgia, unspecified     Decreased strength, endurance, and mobility     Bilateral hip pain     Myofascial pain     Chronic midline low back pain without sciatica      Physician: Anoop Perales MD    Visit Date: 5/3/2022    Physician Orders: PT Eval and Treat  Medical Diagnosis from Referral: dorsalgia, greater trochanteric bursitis of both hips, myofascial pain  Evaluation Date: 4/27/2022  Authorization Period Expiration: 12/31/2022  Plan of Care Expiration: 7/26/2022  Progress Note Due: 5/27/2022  Visit # / Visits authorized: 1/20 (+1 for eval)   FOTO: 1/ 3      Precautions: Standard    PTA Visit #: 0/5     Time In: 1232  Time Out: 1304  Total Billable Time: 32 minutes (Billing reflects 1 on 1 treatment time spent with patient)     SUBJECTIVE     Patient reports: that she is hurting some today, but his is most likely due to her sitting for a long time and studying this morning. Her pain is along the middle of the low to mid back.     He/She N/A compliant with home exercise program.  Response to previous treatment: good  Functional change: none noted yet    Pain: 6/10     Location: bilateral hips and low back    OBJECTIVE     Objective Measures updated at progress report unless specified.       TREATMENT     Haley received the treatments listed below:       MANUAL THERAPY TECHNIQUES were applied for (5) minutes, including:    Manual Intervention Performed Today    Soft Tissue Mobilization x bilateral thoracic paraspinals and lumbar paraspinals    Joint Mobilizations x Grade II-III PA thoracic spine jt mobilizations   Mobilization with movement          Functional Dry Needling        Plan for Next Visit:        THERAPEUTIC EXERCISES to develop strength, endurance,  "ROM, flexibility, posture and core stabilization for (27) minutes including:    Intervention Performed Today    Pelvic tilts x Attempted but was unable to properly perform so discontinued   Abdominal bracing with stability ball x 20x, 3" holds   bridges x 2x10   clams x 2 x 10 each LE   Piriformis stretch x 3 x 30" holds                  Extended time with exercises   Plan for Next Visit:        PATIENT EDUCATION AND HOME EXERCISES     Home Exercises Provided and Patient Education Provided     Education provided:    Patient educated on biomechanical justification for therapeutic exercise and importance of compliance with HEP in order to improve overall impairments and QOL    Patient was educated on all the above exercise prior/during/after for proper posture, positioning, and execution for safe performance with home exercise program.    Patient educated on the importance of improved core and upper and lower extremity strength in order to improve alignment of the spine and upper and lower extremities with static positions and dynamic movement.    Patient educated on the importance of strong core and lower extremity musculature in order to improve both static and dynamic balance, improve gait mechanics, reduce fall risk and improve household and community mobility.       Written Home Exercises Provided: yes.  Exercises were reviewed and Haley was able to demonstrate them prior to the end of the session.  Haley demonstrated good  understanding of the education provided. See EMR under Patient Instructions for exercises provided during therapy sessions.      ASSESSMENT     Patient tolerated treatment well. She completed exercises with only minimal difficulty but without increased complaint. Patient was unable to achieve proper form with pelvic tilts today, so activity was held. Focused on hip strengthening and core stabilization. Increased muscle guarding noted along thoracic and lumbar paraspinals, which " decreased with manual therapy. Instructed open book for HEP, and patient expressed understanding. She was able to perform a few repetitions of each successfully and with good relief. Patient reported good relief post treatment today.     Haley is progressing well towards her goals.   Pt prognosis is Good.     Pt will continue to benefit from skilled outpatient physical therapy to address the deficits listed in the problem list box on initial evaluation, provide pt/family education and to maximize pt's level of independence in the home and community environment.     Pt's spiritual, cultural and educational needs considered and pt agreeable to plan of care and goals.     Anticipated Barriers for therapy: chronicity of condition       GOALS:     Short Term Goals:  6 weeks Progress    1. Pain: Pt will demonstrate improved pain by reports of less than or equal to 8/10 worst pain on the verbal rating scale in order to progress toward maximal functional ability and improve QOL. PC   2. Function: Patient will demonstrate improved function as indicated by a functional limitation score of less than or equal to 32 out of 100 on FOTO. PC   3. Mobility: Patient will improve AROM to 50% of stated goals, listed in objective measures above, in order to progress towards independence with functional activities.  PC   4. Strength: Patient will improve strength to 50% of stated goals, listed in objective measures above, in order to progress towards independence with functional activities.  PC   5. Gait: Patient will demonstrate improved gait mechanics in order to improve functional mobility, improve quality of life, and decrease risk of further injury or fall.  PC   6. HEP: Patient will demonstrate independence with HEP in order to progress toward functional independence. PC   7. Improve postural awareness.  PC      Long Term Goals:  12 weeks Progress   1. Pain: Pt will demonstrate improved pain by reports of less than or equal to  6/10 worst pain on the verbal rating scale in order to progress toward maximal functional ability and improve QOL.   PC   2. Function: Patient will demonstrate improved function as indicated by a functional limitation score of less than or equal to 26 out of 100 on FOTO. PC   3. Mobility: Patient will improve AROM to stated goals, listed in objective measures above, in order to return to maximal functional potential and improve quality of life. PC   4. Strength: Patient will improve strength to stated goals, listed in objective measures above, in order to improve functional independence and quality of life. PC   5. Gait: Patient will demonstrate normalized gait mechanics with minimal compensation in order to return to PLOF. PC   6. Patient will return to normal ADL's, IADL's, community involvement, recreational activities, and work-related activities with less than or equal to 3/10 pain and maximal function.  PC         Goals Key:  PC= progressing/continue; PM= partially met;        DC= discontinue    PLAN     Continue Plan of Care (POC) and progress per patient tolerance. See treatment section for details on planned progressions next session.    4/27/2022 (evaluation): Outpatient Physical Therapy 2 times weekly for 12 weeks to include any combination of the following interventions: virtual visits, dry needling, modalities, electrical stimulation (IFC, Pre-Mod, Attended with Functional Dry Needling), Aquatic Therapy, Cervical/Lumbar Traction, Gait Training, Manual Therapy, Neuromuscular Re-ed, Patient Education, Self Care, Therapeutic Exercise and Therapeutic Activites       Angie Stewart, PT

## 2022-05-10 ENCOUNTER — OFFICE VISIT (OUTPATIENT)
Dept: INTERNAL MEDICINE | Facility: CLINIC | Age: 42
End: 2022-05-10
Payer: OTHER GOVERNMENT

## 2022-05-10 VITALS
RESPIRATION RATE: 16 BRPM | BODY MASS INDEX: 26.41 KG/M2 | HEART RATE: 70 BPM | HEIGHT: 65 IN | SYSTOLIC BLOOD PRESSURE: 166 MMHG | OXYGEN SATURATION: 98 % | DIASTOLIC BLOOD PRESSURE: 106 MMHG | WEIGHT: 158.5 LBS

## 2022-05-10 DIAGNOSIS — F41.9 ANXIETY: Primary | ICD-10-CM

## 2022-05-10 DIAGNOSIS — I10 ESSENTIAL HYPERTENSION: ICD-10-CM

## 2022-05-10 PROCEDURE — 99214 OFFICE O/P EST MOD 30 MIN: CPT | Mod: S$PBB,,, | Performed by: NURSE PRACTITIONER

## 2022-05-10 PROCEDURE — 99214 PR OFFICE/OUTPT VISIT, EST, LEVL IV, 30-39 MIN: ICD-10-PCS | Mod: S$PBB,,, | Performed by: NURSE PRACTITIONER

## 2022-05-10 PROCEDURE — 99999 PR PBB SHADOW E&M-EST. PATIENT-LVL IV: ICD-10-PCS | Mod: PBBFAC,,, | Performed by: NURSE PRACTITIONER

## 2022-05-10 PROCEDURE — 99214 OFFICE O/P EST MOD 30 MIN: CPT | Mod: PBBFAC | Performed by: NURSE PRACTITIONER

## 2022-05-10 PROCEDURE — 99999 PR PBB SHADOW E&M-EST. PATIENT-LVL IV: CPT | Mod: PBBFAC,,, | Performed by: NURSE PRACTITIONER

## 2022-05-10 RX ORDER — AMLODIPINE BESYLATE 5 MG/1
5 TABLET ORAL DAILY
Qty: 30 TABLET | Refills: 0 | Status: SHIPPED | OUTPATIENT
Start: 2022-05-10 | End: 2023-05-10

## 2022-05-10 RX ORDER — ALPRAZOLAM 0.5 MG/1
0.5 TABLET ORAL 2 TIMES DAILY PRN
Qty: 60 TABLET | Refills: 0 | Status: SHIPPED | OUTPATIENT
Start: 2022-05-10 | End: 2022-06-09

## 2022-05-10 NOTE — PROGRESS NOTES
Subjective:       Patient ID: Haley Hammer is a 41 y.o. female.    Chief Complaint: Hypertension    HPI      Consistent elevated BP over the course of several months  I suggested BP med to her in the past, she declined  She returns today due to gyn suggestion due to elevated  She reports that her BP was prob elevated due to stress, in school about to graduate and dealing with back pain , currently in PT  She denies cp, sob, HA, or any other focal neuro s/s      Past Medical History:   Diagnosis Date    Abnormal Pap smear of vagina     Anemia     Back pain in pregnancy     Rh negative state in antepartum period-  Pt is AB - 2014    Sickle cell trait      does not carry sickle cell trait     Past Surgical History:   Procedure Laterality Date     SECTION      x3    DILATION AND CURETTAGE OF UTERUS USING SUCTION N/A 3/14/2022    Procedure: DILATION AND CURETTAGE, UTERUS, USING SUCTION;  Surgeon: Angely Way MD;  Location: [facility] OR;  Service: OB/GYN;  Laterality: N/A;    LAPAROSCOPIC SALPINGECTOMY Left 3/14/2022    Procedure: SALPINGECTOMY, LAPAROSCOPIC;  Surgeon: Angely Way MD;  Location: [facility] OR;  Service: OB/GYN;  Laterality: Left;    LAPAROSCOPIC TREATMENT OF ECTOPIC PREGNANCY Left 3/14/2022    Procedure: REMOVAL, ECTOPIC PREGNANCY, LAPAROSCOPIC;  Surgeon: Angely Way MD;  Location: [facility] OR;  Service: OB/GYN;  Laterality: Left;     Social History     Socioeconomic History    Marital status:    Tobacco Use    Smoking status: Never Smoker    Smokeless tobacco: Never Used   Substance and Sexual Activity    Alcohol use: No    Drug use: No    Sexual activity: Not Currently     Partners: Male     Birth control/protection: Condom   Other Topics Concern    Are you pregnant or think you may be? No    Breast-feeding No     Review of patient's allergies indicates:  No Known Allergies  Current Outpatient Medications   Medication Sig    ferrous sulfate (FEOSOL) 325  mg (65 mg iron) Tab tablet Take 325 mg by mouth once daily.    ALPRAZolam (XANAX) 0.5 MG tablet Take 1 tablet (0.5 mg total) by mouth 2 (two) times daily as needed for Anxiety.    amLODIPine (NORVASC) 5 MG tablet Take 1 tablet (5 mg total) by mouth once daily.    HYDROcodone-acetaminophen (NORCO) 5-325 mg per tablet Take 1 tablet by mouth every 6 (six) hours as needed for Pain. (Patient not taking: No sig reported)    ibuprofen (ADVIL,MOTRIN) 600 MG tablet Take 1 tablet (600 mg total) by mouth every 8 (eight) hours as needed for Pain. (Patient not taking: No sig reported)     No current facility-administered medications for this visit.           Review of Systems   Constitutional: Negative for activity change, appetite change, chills, diaphoresis, fatigue, fever and unexpected weight change.   HENT: Negative for congestion, ear pain, postnasal drip, rhinorrhea, sinus pressure, sinus pain, sneezing, sore throat, tinnitus, trouble swallowing and voice change.    Eyes: Negative for photophobia, pain and visual disturbance.   Respiratory: Negative for cough, chest tightness, shortness of breath and wheezing.    Cardiovascular: Negative for chest pain, palpitations and leg swelling.   Gastrointestinal: Negative for abdominal distention, abdominal pain, constipation, diarrhea, nausea and vomiting.   Genitourinary: Negative for decreased urine volume, difficulty urinating, dysuria, flank pain, frequency, hematuria and urgency.   Musculoskeletal: Negative for arthralgias, back pain, joint swelling, neck pain and neck stiffness.   Allergic/Immunologic: Negative for immunocompromised state.   Neurological: Negative for dizziness, tremors, seizures, syncope, facial asymmetry, speech difficulty, weakness, light-headedness, numbness and headaches.   Hematological: Negative for adenopathy. Does not bruise/bleed easily.   Psychiatric/Behavioral: Negative for confusion and sleep disturbance. The patient is nervous/anxious.         Objective:      Physical Exam  Vitals reviewed.   Cardiovascular:      Rate and Rhythm: Normal rate and regular rhythm.      Pulses: Normal pulses.      Heart sounds: Normal heart sounds.   Pulmonary:      Effort: Pulmonary effort is normal.      Breath sounds: Normal breath sounds.   Musculoskeletal:      Cervical back: Normal range of motion and neck supple.   Skin:     General: Skin is warm and dry.      Capillary Refill: Capillary refill takes less than 2 seconds.   Neurological:      General: No focal deficit present.      Mental Status: She is alert and oriented to person, place, and time.   Psychiatric:         Mood and Affect: Mood is anxious and depressed. Affect is tearful.         Assessment:     Vitals:    05/10/22 1022   BP: (!) 166/106   Pulse: 70   Resp: 16         1. Anxiety    2. Essential hypertension        Plan:   Anxiety  -     ALPRAZolam (XANAX) 0.5 MG tablet; Take 1 tablet (0.5 mg total) by mouth 2 (two) times daily as needed for Anxiety.  Dispense: 60 tablet; Refill: 0    Essential hypertension  -     amLODIPine (NORVASC) 5 MG tablet; Take 1 tablet (5 mg total) by mouth once daily.  Dispense: 30 tablet; Refill: 0      Start norvasc-2 week virtual-BID checks  Xanax PRN-meds/se discussed-temp rx only

## 2022-05-11 ENCOUNTER — CLINICAL SUPPORT (OUTPATIENT)
Dept: REHABILITATION | Facility: HOSPITAL | Age: 42
End: 2022-05-11
Payer: OTHER GOVERNMENT

## 2022-05-11 DIAGNOSIS — M70.62 GREATER TROCHANTERIC BURSITIS OF BOTH HIPS: Primary | ICD-10-CM

## 2022-05-11 DIAGNOSIS — M54.9 DORSALGIA, UNSPECIFIED: ICD-10-CM

## 2022-05-11 DIAGNOSIS — M25.551 BILATERAL HIP PAIN: ICD-10-CM

## 2022-05-11 DIAGNOSIS — R53.1 DECREASED STRENGTH, ENDURANCE, AND MOBILITY: ICD-10-CM

## 2022-05-11 DIAGNOSIS — M25.552 BILATERAL HIP PAIN: ICD-10-CM

## 2022-05-11 DIAGNOSIS — Z74.09 DECREASED STRENGTH, ENDURANCE, AND MOBILITY: ICD-10-CM

## 2022-05-11 DIAGNOSIS — M79.18 MYOFASCIAL PAIN: ICD-10-CM

## 2022-05-11 DIAGNOSIS — M70.61 GREATER TROCHANTERIC BURSITIS OF BOTH HIPS: Primary | ICD-10-CM

## 2022-05-11 DIAGNOSIS — R68.89 DECREASED STRENGTH, ENDURANCE, AND MOBILITY: ICD-10-CM

## 2022-05-11 DIAGNOSIS — G89.29 CHRONIC MIDLINE LOW BACK PAIN WITHOUT SCIATICA: ICD-10-CM

## 2022-05-11 DIAGNOSIS — M54.50 CHRONIC MIDLINE LOW BACK PAIN WITHOUT SCIATICA: ICD-10-CM

## 2022-05-11 PROCEDURE — 97112 NEUROMUSCULAR REEDUCATION: CPT | Performed by: PHYSICAL THERAPIST

## 2022-05-11 PROCEDURE — 97140 MANUAL THERAPY 1/> REGIONS: CPT | Performed by: PHYSICAL THERAPIST

## 2022-05-11 PROCEDURE — 97110 THERAPEUTIC EXERCISES: CPT | Performed by: PHYSICAL THERAPIST

## 2022-05-11 NOTE — PROGRESS NOTES
[facility]   Physical Therapy Treatment Note     Name: Haley Hammer  Clinic Number: [id number]    Therapy Diagnosis:   Encounter Diagnoses   Name Primary?    Greater trochanteric bursitis of both hips Yes    Dorsalgia, unspecified     Decreased strength, endurance, and mobility     Bilateral hip pain     Myofascial pain     Chronic midline low back pain without sciatica      Physician: Anoop Perales MD    Visit Date: 5/11/2022    Physician Orders: PT Eval and Treat  Medical Diagnosis from Referral: dorsalgia, greater trochanteric bursitis of both hips, myofascial pain  Evaluation Date: 4/27/2022  Authorization Period Expiration: 12/31/2022  Plan of Care Expiration: 7/26/2022  Progress Note Due: 5/27/2022  Visit # / Visits authorized: 2/20 (+1 for eval)   FOTO: 1/ 3      Precautions: Standard    PTA Visit #: 0/5     Time In: 1138  Time Out: 1221  Total Billable Time: 42 minutes (Billing reflects 1 on 1 treatment time spent with patient)     SUBJECTIVE     Patient reports: that she is feeling much better today. She felt good following previous visit, and she is also done with her test so she hasn't been having to sit and study for as long.     He/She N/A compliant with home exercise program.  Response to previous treatment: good  Functional change: decreased pain levels    Pain: 3/10     Location: bilateral hips and low back    OBJECTIVE     Objective Measures updated at progress report unless specified.       TREATMENT     Haley received the treatments listed below:       MANUAL THERAPY TECHNIQUES were applied for (8) minutes, including:    Manual Intervention Performed Today    Soft Tissue Mobilization x bilateral thoracic paraspinals and lumbar paraspinals    Joint Mobilizations x Grade II-III PA thoracic spine jt mobilizations   Mobilization with movement          Functional Dry Needling        Plan for Next Visit:        THERAPEUTIC EXERCISES to develop strength,  "endurance, ROM, flexibility, posture and core stabilization for (15) minutes including:    Intervention Performed Today    Pelvic tilts  Attempted but was unable to properly perform so discontinued   Abdominal bracing with stability ball x 20x, 3" holds   bridges x 3x10   clams x 2 x 10 each LE   Piriformis stretch x 3 x 30" holds                  Extended time with exercises   Plan for Next Visit:      NEUROMUSCULAR RE-EDUCATION ACTIVITIES to improve Balance, Coordination, Kinesthetic, Sense, Proprioception and Posture for (19) minutes.  The following were included:    Intervention Performed Today    Dead Bugs  x 2 x 10 each   Pilate's Table Top x 8 x 5" holds   Quadruped alt LE x 8x each side   Quadruped alt UE/LE x Showed for progression purposes and demonstrated understanding, but PT advised patient to only perform alt LE as part of HEP at this time   Standing hip 3-way x 8x each LE                    Plan for Next Visit:         PATIENT EDUCATION AND HOME EXERCISES     Home Exercises Provided and Patient Education Provided     Education provided:    Patient educated on biomechanical justification for therapeutic exercise and importance of compliance with HEP in order to improve overall impairments and QOL    Patient was educated on all the above exercise prior/during/after for proper posture, positioning, and execution for safe performance with home exercise program.    Patient educated on the importance of improved core and upper and lower extremity strength in order to improve alignment of the spine and upper and lower extremities with static positions and dynamic movement.    Patient educated on the importance of strong core and lower extremity musculature in order to improve both static and dynamic balance, improve gait mechanics, reduce fall risk and improve household and community mobility.       Written Home Exercises Provided: yes.  Exercises were reviewed and Haley was able to demonstrate them " prior to the end of the session.  Haley demonstrated good  understanding of the education provided. See EMR under Patient Instructions for exercises provided during therapy sessions.      ASSESSMENT     Patient did well with treatment today. She completed exercises with less difficulty and without increased complaint. Patient was able to be progressed with core and hip strengthening exercises today. She required frequent cues initially with dead bug and quadruped exercises in order to maintain proper form and keep core activated. Good fatigue noted with standing hip 3-way. Patient again responded well to manual therapy this visit.     Haley is progressing well towards her goals.   Pt prognosis is Good.     Pt will continue to benefit from skilled outpatient physical therapy to address the deficits listed in the problem list box on initial evaluation, provide pt/family education and to maximize pt's level of independence in the home and community environment.     Pt's spiritual, cultural and educational needs considered and pt agreeable to plan of care and goals.     Anticipated Barriers for therapy: chronicity of condition       GOALS:     Short Term Goals:  6 weeks Progress    1. Pain: Pt will demonstrate improved pain by reports of less than or equal to 8/10 worst pain on the verbal rating scale in order to progress toward maximal functional ability and improve QOL. PC   2. Function: Patient will demonstrate improved function as indicated by a functional limitation score of less than or equal to 32 out of 100 on FOTO. PC   3. Mobility: Patient will improve AROM to 50% of stated goals, listed in objective measures above, in order to progress towards independence with functional activities.  PC   4. Strength: Patient will improve strength to 50% of stated goals, listed in objective measures above, in order to progress towards independence with functional activities.  PC   5. Gait: Patient will demonstrate  improved gait mechanics in order to improve functional mobility, improve quality of life, and decrease risk of further injury or fall.  PC   6. HEP: Patient will demonstrate independence with HEP in order to progress toward functional independence. PC   7. Improve postural awareness.  PC      Long Term Goals:  12 weeks Progress   1. Pain: Pt will demonstrate improved pain by reports of less than or equal to 6/10 worst pain on the verbal rating scale in order to progress toward maximal functional ability and improve QOL.   PC   2. Function: Patient will demonstrate improved function as indicated by a functional limitation score of less than or equal to 26 out of 100 on FOTO. PC   3. Mobility: Patient will improve AROM to stated goals, listed in objective measures above, in order to return to maximal functional potential and improve quality of life. PC   4. Strength: Patient will improve strength to stated goals, listed in objective measures above, in order to improve functional independence and quality of life. PC   5. Gait: Patient will demonstrate normalized gait mechanics with minimal compensation in order to return to PLOF. PC   6. Patient will return to normal ADL's, IADL's, community involvement, recreational activities, and work-related activities with less than or equal to 3/10 pain and maximal function.  PC         Goals Key:  PC= progressing/continue; PM= partially met;        DC= discontinue    PLAN     Continue Plan of Care (POC) and progress per patient tolerance. See treatment section for details on planned progressions next session.    4/27/2022 (evaluation): Outpatient Physical Therapy 2 times weekly for 12 weeks to include any combination of the following interventions: virtual visits, dry needling, modalities, electrical stimulation (IFC, Pre-Mod, Attended with Functional Dry Needling), Aquatic Therapy, Cervical/Lumbar Traction, Gait Training, Manual Therapy, Neuromuscular Re-ed, Patient Education,  Self Care, Therapeutic Exercise and Therapeutic Activites       Angie Stewart, PT

## 2022-05-16 ENCOUNTER — CLINICAL SUPPORT (OUTPATIENT)
Dept: REHABILITATION | Facility: HOSPITAL | Age: 42
End: 2022-05-16
Payer: OTHER GOVERNMENT

## 2022-05-16 DIAGNOSIS — R68.89 DECREASED STRENGTH, ENDURANCE, AND MOBILITY: ICD-10-CM

## 2022-05-16 DIAGNOSIS — M54.9 DORSALGIA, UNSPECIFIED: ICD-10-CM

## 2022-05-16 DIAGNOSIS — M70.61 GREATER TROCHANTERIC BURSITIS OF BOTH HIPS: Primary | ICD-10-CM

## 2022-05-16 DIAGNOSIS — M25.551 BILATERAL HIP PAIN: ICD-10-CM

## 2022-05-16 DIAGNOSIS — M25.552 BILATERAL HIP PAIN: ICD-10-CM

## 2022-05-16 DIAGNOSIS — M70.62 GREATER TROCHANTERIC BURSITIS OF BOTH HIPS: Primary | ICD-10-CM

## 2022-05-16 DIAGNOSIS — Z74.09 DECREASED STRENGTH, ENDURANCE, AND MOBILITY: ICD-10-CM

## 2022-05-16 DIAGNOSIS — M79.18 MYOFASCIAL PAIN: ICD-10-CM

## 2022-05-16 DIAGNOSIS — G89.29 CHRONIC MIDLINE LOW BACK PAIN WITHOUT SCIATICA: ICD-10-CM

## 2022-05-16 DIAGNOSIS — M54.50 CHRONIC MIDLINE LOW BACK PAIN WITHOUT SCIATICA: ICD-10-CM

## 2022-05-16 DIAGNOSIS — R53.1 DECREASED STRENGTH, ENDURANCE, AND MOBILITY: ICD-10-CM

## 2022-05-16 PROCEDURE — 97110 THERAPEUTIC EXERCISES: CPT | Mod: CQ

## 2022-05-16 PROCEDURE — 97112 NEUROMUSCULAR REEDUCATION: CPT | Mod: CQ

## 2022-05-16 NOTE — PROGRESS NOTES
[facility]   Physical Therapy Treatment Note     Name: Haley Hammer  Clinic Number: [id number]    Therapy Diagnosis:   Encounter Diagnoses   Name Primary?    Greater trochanteric bursitis of both hips Yes    Dorsalgia, unspecified     Decreased strength, endurance, and mobility     Bilateral hip pain     Myofascial pain     Chronic midline low back pain without sciatica      Physician: Anoop Perales MD    Visit Date: 5/16/2022    Physician Orders: PT Eval and Treat  Medical Diagnosis from Referral: dorsalgia, greater trochanteric bursitis of both hips, myofascial pain  Evaluation Date: 4/27/2022  Authorization Period Expiration: 12/31/2022  Plan of Care Expiration: 7/26/2022  Progress Note Due: 5/27/2022  Visit # / Visits authorized: 3/20 (+1 for eval)   FOTO: 1/ 3      Precautions: Standard    PTA Visit #: 1/5     Time In: 0945 (late arrival)  Time Out: 1017  Total Billable Time: 30 minutes (Billing reflects 1 on 1 treatment time spent with patient)     SUBJECTIVE     Patient reports: she is feeling better. She has not had any problems.     He/She N/A compliant with home exercise program.  Response to previous treatment: good  Functional change: decreased pain levels    Pain: 4/10     Location: bilateral hips and low back    OBJECTIVE     Objective Measures updated at progress report unless specified.       TREATMENT     Haley received the treatments listed below:       MANUAL THERAPY TECHNIQUES were applied for (0) minutes, including:    Manual Intervention Performed Today    Soft Tissue Mobilization  bilateral thoracic paraspinals and lumbar paraspinals    Joint Mobilizations  Grade II-III PA thoracic spine jt mobilizations   Mobilization with movement          Functional Dry Needling        Plan for Next Visit:        THERAPEUTIC EXERCISES to develop strength, endurance, ROM, flexibility, posture and core stabilization for (15) minutes including:    Intervention  "Performed Today    Recumbent bike for endurance x Level 1 5 min   Pelvic tilts  Attempted but was unable to properly perform so discontinued   Abdominal bracing with stability ball x 20x, 3" holds   bridges x 3x10   clams x 3 x 10 each LE   Piriformis stretch x 3 x 30" holds                       Extended time with exercises   Plan for Next Visit:      NEUROMUSCULAR RE-EDUCATION ACTIVITIES to improve Balance, Coordination, Kinesthetic, Sense, Proprioception and Posture for (15) minutes.  The following were included:    Intervention Performed Today    Dead Bugs  x 2 x 10 each   Pilate's Table Top x 5 x 10" holds   Quadruped alt LE  8x each side   Quadruped alt UE/LE  Showed for progression purposes and demonstrated understanding, but PT advised patient to only perform alt LE as part of HEP at this time   Standing hip 3-way x 10x each LE red   Side stepping x Red 5 laps   Monster walks forwards/backwards x Red 5 laps          Plan for Next Visit:         PATIENT EDUCATION AND HOME EXERCISES     Home Exercises Provided and Patient Education Provided     Education provided:    Patient educated on biomechanical justification for therapeutic exercise and importance of compliance with HEP in order to improve overall impairments and QOL    Patient was educated on all the above exercise prior/during/after for proper posture, positioning, and execution for safe performance with home exercise program.    Patient educated on the importance of improved core and upper and lower extremity strength in order to improve alignment of the spine and upper and lower extremities with static positions and dynamic movement.    Patient educated on the importance of strong core and lower extremity musculature in order to improve both static and dynamic balance, improve gait mechanics, reduce fall risk and improve household and community mobility.       Written Home Exercises Provided: Continue prior home exercise program  Exercises were " reviewed and Haley was able to demonstrate them prior to the end of the session.  Haley demonstrated good  understanding of the education provided. See EMR under Patient Instructions for exercises provided during therapy sessions.      ASSESSMENT     Patient with late arrival to session today, not all planned interventions implemented. Added side stepping and monster walks, heavy verbal cues necessary for proper performance, patient with good fatigue noted. Added red band to hip 3 way, patient challenged. Verbal cues necessary for upright posture and core activation during session, performance improved with practice. Patient left session with some soreness but no reports of increased pain.     Haley is progressing well towards her goals.   Pt prognosis is Good.     Pt will continue to benefit from skilled outpatient physical therapy to address the deficits listed in the problem list box on initial evaluation, provide pt/family education and to maximize pt's level of independence in the home and community environment.     Pt's spiritual, cultural and educational needs considered and pt agreeable to plan of care and goals.     Anticipated Barriers for therapy: chronicity of condition       GOALS:     Short Term Goals:  6 weeks Progress    1. Pain: Pt will demonstrate improved pain by reports of less than or equal to 8/10 worst pain on the verbal rating scale in order to progress toward maximal functional ability and improve QOL. PC   2. Function: Patient will demonstrate improved function as indicated by a functional limitation score of less than or equal to 32 out of 100 on FOTO. PC   3. Mobility: Patient will improve AROM to 50% of stated goals, listed in objective measures above, in order to progress towards independence with functional activities.  PC   4. Strength: Patient will improve strength to 50% of stated goals, listed in objective measures above, in order to progress towards independence with  functional activities.  PC   5. Gait: Patient will demonstrate improved gait mechanics in order to improve functional mobility, improve quality of life, and decrease risk of further injury or fall.  PC   6. HEP: Patient will demonstrate independence with HEP in order to progress toward functional independence. PC   7. Improve postural awareness.  PC      Long Term Goals:  12 weeks Progress   1. Pain: Pt will demonstrate improved pain by reports of less than or equal to 6/10 worst pain on the verbal rating scale in order to progress toward maximal functional ability and improve QOL.   PC   2. Function: Patient will demonstrate improved function as indicated by a functional limitation score of less than or equal to 26 out of 100 on FOTO. PC   3. Mobility: Patient will improve AROM to stated goals, listed in objective measures above, in order to return to maximal functional potential and improve quality of life. PC   4. Strength: Patient will improve strength to stated goals, listed in objective measures above, in order to improve functional independence and quality of life. PC   5. Gait: Patient will demonstrate normalized gait mechanics with minimal compensation in order to return to PLOF. PC   6. Patient will return to normal ADL's, IADL's, community involvement, recreational activities, and work-related activities with less than or equal to 3/10 pain and maximal function.  PC         Goals Key:  PC= progressing/continue; PM= partially met;        DC= discontinue    PLAN     Continue Plan of Care (POC) and progress per patient tolerance. See treatment section for details on planned progressions next session.    4/27/2022 (evaluation): Outpatient Physical Therapy 2 times weekly for 12 weeks to include any combination of the following interventions: virtual visits, dry needling, modalities, electrical stimulation (IFC, Pre-Mod, Attended with Functional Dry Needling), Aquatic Therapy, Cervical/Lumbar Traction, Gait  Training, Manual Therapy, Neuromuscular Re-ed, Patient Education, Self Care, Therapeutic Exercise and Therapeutic Activites       Zayda Warner, PTA

## 2022-05-18 ENCOUNTER — CLINICAL SUPPORT (OUTPATIENT)
Dept: REHABILITATION | Facility: HOSPITAL | Age: 42
End: 2022-05-18
Payer: OTHER GOVERNMENT

## 2022-05-18 DIAGNOSIS — M54.50 CHRONIC MIDLINE LOW BACK PAIN WITHOUT SCIATICA: ICD-10-CM

## 2022-05-18 DIAGNOSIS — R53.1 DECREASED STRENGTH, ENDURANCE, AND MOBILITY: ICD-10-CM

## 2022-05-18 DIAGNOSIS — M79.18 MYOFASCIAL PAIN: ICD-10-CM

## 2022-05-18 DIAGNOSIS — M25.552 BILATERAL HIP PAIN: ICD-10-CM

## 2022-05-18 DIAGNOSIS — M70.62 GREATER TROCHANTERIC BURSITIS OF BOTH HIPS: Primary | ICD-10-CM

## 2022-05-18 DIAGNOSIS — M25.551 BILATERAL HIP PAIN: ICD-10-CM

## 2022-05-18 DIAGNOSIS — M54.9 DORSALGIA, UNSPECIFIED: ICD-10-CM

## 2022-05-18 DIAGNOSIS — Z74.09 DECREASED STRENGTH, ENDURANCE, AND MOBILITY: ICD-10-CM

## 2022-05-18 DIAGNOSIS — M70.61 GREATER TROCHANTERIC BURSITIS OF BOTH HIPS: Primary | ICD-10-CM

## 2022-05-18 DIAGNOSIS — G89.29 CHRONIC MIDLINE LOW BACK PAIN WITHOUT SCIATICA: ICD-10-CM

## 2022-05-18 DIAGNOSIS — R68.89 DECREASED STRENGTH, ENDURANCE, AND MOBILITY: ICD-10-CM

## 2022-05-18 PROCEDURE — 97110 THERAPEUTIC EXERCISES: CPT | Mod: CQ

## 2022-05-18 PROCEDURE — 97112 NEUROMUSCULAR REEDUCATION: CPT | Mod: CQ

## 2022-05-18 NOTE — PROGRESS NOTES
[facility]   Physical Therapy Treatment Note     Name: Haley Hammer  Clinic Number: [id number]    Therapy Diagnosis:   Encounter Diagnoses   Name Primary?    Greater trochanteric bursitis of both hips Yes    Dorsalgia, unspecified     Decreased strength, endurance, and mobility     Bilateral hip pain     Myofascial pain     Chronic midline low back pain without sciatica      Physician: Anoop Perales MD    Visit Date: 5/18/2022    Physician Orders: PT Eval and Treat  Medical Diagnosis from Referral: dorsalgia, greater trochanteric bursitis of both hips, myofascial pain  Evaluation Date: 4/27/2022  Authorization Period Expiration: 12/31/2022  Plan of Care Expiration: 7/26/2022  Progress Note Due: 5/27/2022  Visit # / Visits authorized: 4/20 (+1 for eval)   FOTO: 1/ 3      Precautions: Standard    PTA Visit #: 2/5     Time In: 1130  Time Out: 1215  Total Billable Time: 40 minutes (Billing reflects 1 on 1 treatment time spent with patient)     SUBJECTIVE     Patient reports: she is feeling pretty good. She was sore after last visit.     He/She N/A compliant with home exercise program.  Response to previous treatment: good  Functional change: decreased pain levels    Pain: 3/10     Location: bilateral hips and low back    OBJECTIVE     Objective Measures updated at progress report unless specified.       TREATMENT     Haley received the treatments listed below:       MANUAL THERAPY TECHNIQUES were applied for (0) minutes, including:    Manual Intervention Performed Today    Soft Tissue Mobilization  bilateral thoracic paraspinals and lumbar paraspinals    Joint Mobilizations  Grade II-III PA thoracic spine jt mobilizations   Mobilization with movement          Functional Dry Needling        Plan for Next Visit:        THERAPEUTIC EXERCISES to develop strength, endurance, ROM, flexibility, posture and core stabilization for (25) minutes including:    Intervention Performed  "Today    Recumbent bike for endurance x Level 1 5 min   Pelvic tilts  Attempted but was unable to properly perform so discontinued   Abdominal bracing with stability ball x 20x, 3" holds   bridges x 3x10   clams  3 x 10 each LE   Piriformis stretch x 1 minute each   Shuttle double leg x Level 4 3 minutes   Shuttle single leg x Level 3 2 minutes each   Lower trunk rotations x 3 minutes 3s holds        Extended time with exercises   Plan for Next Visit:      NEUROMUSCULAR RE-EDUCATION ACTIVITIES to improve Balance, Coordination, Kinesthetic, Sense, Proprioception and Posture for (15) minutes.  The following were included:    Intervention Performed Today    Dead Bugs   2 x 10 each   Pilate's Table Top x 5 x 10" holds   Quadruped alt LE  8x each side   Quadruped alt UE/LE  Showed for progression purposes and demonstrated understanding, but PT advised patient to only perform alt LE as part of HEP at this time   Standing hip 3-way x 12x each LE red   Side stepping x Red 5 laps   Monster walks forwards/backwards x Red 5 laps          Plan for Next Visit:         PATIENT EDUCATION AND HOME EXERCISES     Home Exercises Provided and Patient Education Provided     Education provided:    Patient educated on biomechanical justification for therapeutic exercise and importance of compliance with HEP in order to improve overall impairments and QOL    Patient was educated on all the above exercise prior/during/after for proper posture, positioning, and execution for safe performance with home exercise program.    Patient educated on the importance of improved core and upper and lower extremity strength in order to improve alignment of the spine and upper and lower extremities with static positions and dynamic movement.    Patient educated on the importance of strong core and lower extremity musculature in order to improve both static and dynamic balance, improve gait mechanics, reduce fall risk and improve household and " community mobility.       Written Home Exercises Provided: Continue prior home exercise program  Exercises were reviewed and Haley was able to demonstrate them prior to the end of the session.  Haley demonstrated good  understanding of the education provided. See EMR under Patient Instructions for exercises provided during therapy sessions.      ASSESSMENT     Patient did well with session today with no complaints of discomfort. Strength and endurance progressions made today, patient with good fatigue noted. Improved performance of standing activities today, some verbal and tactile cues necessary for proper performance, but decreased from last visit. Shuttle activity added today, patient with no issues. Patient left session with soreness but no reports of increased pain.     Haley is progressing well towards her goals.   Pt prognosis is Good.     Pt will continue to benefit from skilled outpatient physical therapy to address the deficits listed in the problem list box on initial evaluation, provide pt/family education and to maximize pt's level of independence in the home and community environment.     Pt's spiritual, cultural and educational needs considered and pt agreeable to plan of care and goals.     Anticipated Barriers for therapy: chronicity of condition       GOALS:     Short Term Goals:  6 weeks Progress    1. Pain: Pt will demonstrate improved pain by reports of less than or equal to 8/10 worst pain on the verbal rating scale in order to progress toward maximal functional ability and improve QOL. PC   2. Function: Patient will demonstrate improved function as indicated by a functional limitation score of less than or equal to 32 out of 100 on FOTO. PC   3. Mobility: Patient will improve AROM to 50% of stated goals, listed in objective measures above, in order to progress towards independence with functional activities.  PC   4. Strength: Patient will improve strength to 50% of stated goals,  listed in objective measures above, in order to progress towards independence with functional activities.  PC   5. Gait: Patient will demonstrate improved gait mechanics in order to improve functional mobility, improve quality of life, and decrease risk of further injury or fall.  PC   6. HEP: Patient will demonstrate independence with HEP in order to progress toward functional independence. PC   7. Improve postural awareness.  PC      Long Term Goals:  12 weeks Progress   1. Pain: Pt will demonstrate improved pain by reports of less than or equal to 6/10 worst pain on the verbal rating scale in order to progress toward maximal functional ability and improve QOL.   PC   2. Function: Patient will demonstrate improved function as indicated by a functional limitation score of less than or equal to 26 out of 100 on FOTO. PC   3. Mobility: Patient will improve AROM to stated goals, listed in objective measures above, in order to return to maximal functional potential and improve quality of life. PC   4. Strength: Patient will improve strength to stated goals, listed in objective measures above, in order to improve functional independence and quality of life. PC   5. Gait: Patient will demonstrate normalized gait mechanics with minimal compensation in order to return to PLOF. PC   6. Patient will return to normal ADL's, IADL's, community involvement, recreational activities, and work-related activities with less than or equal to 3/10 pain and maximal function.  PC         Goals Key:  PC= progressing/continue; PM= partially met;        DC= discontinue    PLAN     Continue Plan of Care (POC) and progress per patient tolerance. See treatment section for details on planned progressions next session.    4/27/2022 (evaluation): Outpatient Physical Therapy 2 times weekly for 12 weeks to include any combination of the following interventions: virtual visits, dry needling, modalities, electrical stimulation (IFC, Pre-Mod, Attended  with Functional Dry Needling), Aquatic Therapy, Cervical/Lumbar Traction, Gait Training, Manual Therapy, Neuromuscular Re-ed, Patient Education, Self Care, Therapeutic Exercise and Therapeutic Activites       Zayda Warner, PTA

## 2022-05-25 ENCOUNTER — CLINICAL SUPPORT (OUTPATIENT)
Dept: REHABILITATION | Facility: HOSPITAL | Age: 42
End: 2022-05-25
Payer: OTHER GOVERNMENT

## 2022-05-25 DIAGNOSIS — M70.61 GREATER TROCHANTERIC BURSITIS OF BOTH HIPS: Primary | ICD-10-CM

## 2022-05-25 DIAGNOSIS — M25.551 BILATERAL HIP PAIN: ICD-10-CM

## 2022-05-25 DIAGNOSIS — M54.9 DORSALGIA, UNSPECIFIED: ICD-10-CM

## 2022-05-25 DIAGNOSIS — M25.552 BILATERAL HIP PAIN: ICD-10-CM

## 2022-05-25 DIAGNOSIS — Z74.09 DECREASED STRENGTH, ENDURANCE, AND MOBILITY: ICD-10-CM

## 2022-05-25 DIAGNOSIS — G89.29 CHRONIC MIDLINE LOW BACK PAIN WITHOUT SCIATICA: ICD-10-CM

## 2022-05-25 DIAGNOSIS — R68.89 DECREASED STRENGTH, ENDURANCE, AND MOBILITY: ICD-10-CM

## 2022-05-25 DIAGNOSIS — M79.18 MYOFASCIAL PAIN: ICD-10-CM

## 2022-05-25 DIAGNOSIS — R53.1 DECREASED STRENGTH, ENDURANCE, AND MOBILITY: ICD-10-CM

## 2022-05-25 DIAGNOSIS — M70.62 GREATER TROCHANTERIC BURSITIS OF BOTH HIPS: Primary | ICD-10-CM

## 2022-05-25 DIAGNOSIS — M54.50 CHRONIC MIDLINE LOW BACK PAIN WITHOUT SCIATICA: ICD-10-CM

## 2022-05-25 PROCEDURE — 97110 THERAPEUTIC EXERCISES: CPT | Performed by: PHYSICAL THERAPIST

## 2022-05-25 PROCEDURE — 97112 NEUROMUSCULAR REEDUCATION: CPT | Performed by: PHYSICAL THERAPIST

## 2022-05-25 NOTE — PLAN OF CARE
[facility]   Physical Therapy Treatment Note + Discharge Summary    Name: Haley Hammer  Clinic Number: [id number]    Therapy Diagnosis:   Encounter Diagnoses   Name Primary?    Greater trochanteric bursitis of both hips Yes    Dorsalgia, unspecified     Decreased strength, endurance, and mobility     Bilateral hip pain     Myofascial pain     Chronic midline low back pain without sciatica      Physician: Anoop Perales MD    Visit Date: 5/25/2022    Physician Orders: PT Eval and Treat  Medical Diagnosis from Referral: dorsalgia, greater trochanteric bursitis of both hips, myofascial pain  Evaluation Date: 4/27/2022  Authorization Period Expiration: 12/31/2022  Plan of Care Expiration: 7/26/2022  Progress Note Due: 5/27/2022  Visit # / Visits authorized: 5/20 (+1 for eval)   FOTO: 1/ 3      Precautions: Standard    PTA Visit #: 0/5     Time In: 1102 (late arrival)  Time Out: 1135  Total Billable Time: 33 minutes (Billing reflects 1 on 1 treatment time spent with patient)     SUBJECTIVE     Patient reports: that she has been feeling better overall. She will be moving to [place] this weekend for a new job, so she would like to be DC with HEP today. Patient would like to discuss a final HEP for when she moves, and she would like to discuss lifting techniques to help with the move.    He/She N/A compliant with home exercise program.  Response to previous treatment: good  Functional change: decreased pain levels    Pain: 0/10     Location: bilateral hips and low back    OBJECTIVE     Objective Measures updated at progress report unless specified.     STRENGTH:     L/E MMT Right Left Pain/Dysfunction with Movement Goal   Hip Flexion  5/5 5/5 No pain with MMT's  4+/5 B   Hip Extension  4/5 4/5  4+/5 B   Hip Abduction  4-/5 4-/5  4+/5 B   Knee Extension 4+/5 4/5  5/5 B   Knee Flexion 5/5 5/5  5/5 B   Hip IR 4/5 4-/5  4+/5 B   Hip ER 4/5 4/5  4+/5 B   Ankle DF 5/5 5/5   5/5 B   Ankle PF  5/5 5/5 5/5 B         MUSCLE LENGTH:      Muscle Tested  Right Left  Goal   Hip Flexors decreased decreased Normal B   Quadriceps decreased decreased Normal B   Hamstrings  decreased decreased Normal B   Piriformis  decreased decreased Normal B   Gastrocnemius  decreased decreased Normal B   Soleus  decreased decreased Normal B    **Although still decreased, muscle length has improved as compared to previous visits.       Palpation: Increased tone and tenderness noted with palpation of bilateral thoracic paraspinals, lumbar paraspinals , quadratus lumborum , glutes, quadriceps and hamstrings, but decreased as compared to previous visits.      Posture:  Pt presents with postural abnormalities which include: forward head, rounded shoulders  and increased lumbar lordosis     Gait Analysis: The patient ambulated with the following assistive device: none; the pt presents with the following gait abnormalities: bradykinetic, increased [place], decreased step length bilateral and decreased hip extension bilateral  5/25/2022: Improved alfred, step length, and bilateral hip extension.        Movement Analysis Observations noted   Sit to stands Improved strength, less guarding, activates core and does not use UE support   Lifting technique Box lifts - demonstrates proper form with ~5 repetitions after PT instruction and demonstration                   TREATMENT     Haley received the treatments listed below:       MANUAL THERAPY TECHNIQUES were applied for (0) minutes, including:    Manual Intervention Performed Today    Soft Tissue Mobilization  bilateral thoracic paraspinals and lumbar paraspinals    Joint Mobilizations  Grade II-III PA thoracic spine jt mobilizations   Mobilization with movement          Functional Dry Needling        Plan for Next Visit:        THERAPEUTIC EXERCISES to develop strength, endurance, ROM, flexibility, posture and core stabilization for (23) minutes including:    Intervention Performed Today  "   Recumbent bike for endurance x Level 1 5 min   Pelvic tilts  Attempted but was unable to properly perform so discontinued   Abdominal bracing with stability ball  20x, 3" holds   bridges x 3x10   clams  3 x 10 each LE   Piriformis stretch x 3 x 30" each LE   Shuttle double leg  Level 4 3 minutes   Shuttle single leg  Level 3 2 minutes each   Lower trunk rotations  3 minutes 3s holds   Final HEP established and discussed x 10 minutes   Extended time with exercises   Plan for Next Visit:      NEUROMUSCULAR RE-EDUCATION ACTIVITIES to improve Balance, Coordination, Kinesthetic, Sense, Proprioception and Posture for (10) minutes.  The following were included:    Intervention Performed Today    Dead Bugs   2 x 10 each   Pilate's Table Top x 5 x 10" holds   Quadruped alt LE  8x each side   Quadruped alt UE/LE  Showed for progression purposes and demonstrated understanding, but PT advised patient to only perform alt LE as part of HEP at this time   Standing hip 3-way x Discussed purpose for HEP   Side stepping  Red 5 laps   Monster walks forwards/backwards  Red 5 laps   Lifting techniques x 5', Discussed proper lifting techniques with box lifts. Patient demonstrated ~3-5 repetitions of understanding with good overall form noted     Plan for Next Visit:         PATIENT EDUCATION AND HOME EXERCISES     Home Exercises Provided and Patient Education Provided     Education provided:    Patient educated on biomechanical justification for therapeutic exercise and importance of compliance with HEP in order to improve overall impairments and QOL    Patient was educated on all the above exercise prior/during/after for proper posture, positioning, and execution for safe performance with home exercise program.    Patient educated on the importance of improved core and upper and lower extremity strength in order to improve alignment of the spine and upper and lower extremities with static positions and dynamic movement. "    Patient educated on the importance of strong core and lower extremity musculature in order to improve both static and dynamic balance, improve gait mechanics, reduce fall risk and improve household and community mobility.       Written Home Exercises Provided: Continue prior home exercise program  Exercises were reviewed and Haley was able to demonstrate them prior to the end of the session.  Haley demonstrated good  understanding of the education provided. See EMR under Patient Instructions for exercises provided during therapy sessions.      ASSESSMENT     Patient tolerated treatment well and has attended 6 total PT visits. She has made good overall progress with PT, demonstrating improvements in functional mobility, LE strength, core stabilization, and overall gait. Patient demonstrates improved form and god understanding of exercises. Her pain levels have decreased, and she has returned to more functional activities with less limitation. Patient demonstrated good understanding of proper lifting techniques to help when she will be moving to [place] this weekend. Educated patient to keep up with HEP consistently but to reach out and find a PT in that area if additional care is needed. She expressed understanding.     Haley is progressing well towards her goals.   Pt prognosis is Good.     Pt will continue to benefit from skilled outpatient physical therapy to address the deficits listed in the problem list box on initial evaluation, provide pt/family education and to maximize pt's level of independence in the home and community environment.     Pt's spiritual, cultural and educational needs considered and pt agreeable to plan of care and goals.     Anticipated Barriers for therapy: chronicity of condition       GOALS:     Short Term Goals:  6 weeks Progress    1. Pain: Pt will demonstrate improved pain by reports of less than or equal to 8/10 worst pain on the verbal rating scale in order to progress  toward maximal functional ability and improve QOL. Met   2. Function: Patient will demonstrate improved function as indicated by a functional limitation score of less than or equal to 32 out of 100 on FOTO. PC   3. Mobility: Patient will improve AROM to 50% of stated goals, listed in objective measures above, in order to progress towards independence with functional activities.  PC   4. Strength: Patient will improve strength to 50% of stated goals, listed in objective measures above, in order to progress towards independence with functional activities.  Met   5. Gait: Patient will demonstrate improved gait mechanics in order to improve functional mobility, improve quality of life, and decrease risk of further injury or fall.  Met   6. HEP: Patient will demonstrate independence with HEP in order to progress toward functional independence. Met   7. Improve postural awareness.  Met      Long Term Goals:  12 weeks Progress   1. Pain: Pt will demonstrate improved pain by reports of less than or equal to 6/10 worst pain on the verbal rating scale in order to progress toward maximal functional ability and improve QOL.   Met   2. Function: Patient will demonstrate improved function as indicated by a functional limitation score of less than or equal to 26 out of 100 on FOTO. PC   3. Mobility: Patient will improve AROM to stated goals, listed in objective measures above, in order to return to maximal functional potential and improve quality of life. PC   4. Strength: Patient will improve strength to stated goals, listed in objective measures above, in order to improve functional independence and quality of life. PM   5. Gait: Patient will demonstrate normalized gait mechanics with minimal compensation in order to return to PLOF. Met   6. Patient will return to normal ADL's, IADL's, community involvement, recreational activities, and work-related activities with less than or equal to 3/10 pain and maximal function.  PM          Goals Key:  PC= progressing/continue; PM= partially met;        DC= discontinue    PLAN     5/25/2022: Patient is considered DC from PT with HEP at this time.     4/27/2022 (evaluation): Outpatient Physical Therapy 2 times weekly for 12 weeks to include any combination of the following interventions: virtual visits, dry needling, modalities, electrical stimulation (IFC, Pre-Mod, Attended with Functional Dry Needling), Aquatic Therapy, Cervical/Lumbar Traction, Gait Training, Manual Therapy, Neuromuscular Re-ed, Patient Education, Self Care, Therapeutic Exercise and Therapeutic Activites       Angie Stewart, PT

## 2022-05-25 NOTE — PROGRESS NOTES
[facility]   Physical Therapy Treatment Note + Discharge Summary    Name: Haley Hammer  Clinic Number: [id number]    Therapy Diagnosis:   Encounter Diagnoses   Name Primary?    Greater trochanteric bursitis of both hips Yes    Dorsalgia, unspecified     Decreased strength, endurance, and mobility     Bilateral hip pain     Myofascial pain     Chronic midline low back pain without sciatica      Physician: Anoop Perales MD    Visit Date: 5/25/2022    Physician Orders: PT Eval and Treat  Medical Diagnosis from Referral: dorsalgia, greater trochanteric bursitis of both hips, myofascial pain  Evaluation Date: 4/27/2022  Authorization Period Expiration: 12/31/2022  Plan of Care Expiration: 7/26/2022  Progress Note Due: 5/27/2022  Visit # / Visits authorized: 5/20 (+1 for eval)   FOTO: 1/ 3      Precautions: Standard    PTA Visit #: 0/5     Time In: 1102 (late arrival)  Time Out: 1135  Total Billable Time: 33 minutes (Billing reflects 1 on 1 treatment time spent with patient)     SUBJECTIVE     Patient reports: that she has been feeling better overall. She will be moving to [place] this weekend for a new job, so she would like to be DC with HEP today. Patient would like to discuss a final HEP for when she moves, and she would like to discuss lifting techniques to help with the move.    He/She N/A compliant with home exercise program.  Response to previous treatment: good  Functional change: decreased pain levels    Pain: 0/10     Location: bilateral hips and low back    OBJECTIVE     Objective Measures updated at progress report unless specified.     STRENGTH:     L/E MMT Right Left Pain/Dysfunction with Movement Goal   Hip Flexion  5/5 5/5 No pain with MMT's  4+/5 B   Hip Extension  4/5 4/5  4+/5 B   Hip Abduction  4-/5 4-/5  4+/5 B   Knee Extension 4+/5 4/5  5/5 B   Knee Flexion 5/5 5/5  5/5 B   Hip IR 4/5 4-/5  4+/5 B   Hip ER 4/5 4/5  4+/5 B   Ankle DF 5/5 5/5   5/5 B   Ankle PF  5/5 5/5 5/5 B         MUSCLE LENGTH:      Muscle Tested  Right Left  Goal   Hip Flexors decreased decreased Normal B   Quadriceps decreased decreased Normal B   Hamstrings  decreased decreased Normal B   Piriformis  decreased decreased Normal B   Gastrocnemius  decreased decreased Normal B   Soleus  decreased decreased Normal B    **Although still decreased, muscle length has improved as compared to previous visits.       Palpation: Increased tone and tenderness noted with palpation of bilateral thoracic paraspinals, lumbar paraspinals , quadratus lumborum , glutes, quadriceps and hamstrings, but decreased as compared to previous visits.      Posture:  Pt presents with postural abnormalities which include: forward head, rounded shoulders  and increased lumbar lordosis     Gait Analysis: The patient ambulated with the following assistive device: none; the pt presents with the following gait abnormalities: bradykinetic, increased [place], decreased step length bilateral and decreased hip extension bilateral  5/25/2022: Improved alfred, step length, and bilateral hip extension.        Movement Analysis Observations noted   Sit to stands Improved strength, less guarding, activates core and does not use UE support   Lifting technique Box lifts - demonstrates proper form with ~5 repetitions after PT instruction and demonstration                   TREATMENT     Haley received the treatments listed below:       MANUAL THERAPY TECHNIQUES were applied for (0) minutes, including:    Manual Intervention Performed Today    Soft Tissue Mobilization  bilateral thoracic paraspinals and lumbar paraspinals    Joint Mobilizations  Grade II-III PA thoracic spine jt mobilizations   Mobilization with movement          Functional Dry Needling        Plan for Next Visit:        THERAPEUTIC EXERCISES to develop strength, endurance, ROM, flexibility, posture and core stabilization for (23) minutes including:    Intervention Performed Today  "   Recumbent bike for endurance x Level 1 5 min   Pelvic tilts  Attempted but was unable to properly perform so discontinued   Abdominal bracing with stability ball  20x, 3" holds   bridges x 3x10   clams  3 x 10 each LE   Piriformis stretch x 3 x 30" each LE   Shuttle double leg  Level 4 3 minutes   Shuttle single leg  Level 3 2 minutes each   Lower trunk rotations  3 minutes 3s holds   Final HEP established and discussed x 10 minutes   Extended time with exercises   Plan for Next Visit:      NEUROMUSCULAR RE-EDUCATION ACTIVITIES to improve Balance, Coordination, Kinesthetic, Sense, Proprioception and Posture for (10) minutes.  The following were included:    Intervention Performed Today    Dead Bugs   2 x 10 each   Pilate's Table Top x 5 x 10" holds   Quadruped alt LE  8x each side   Quadruped alt UE/LE  Showed for progression purposes and demonstrated understanding, but PT advised patient to only perform alt LE as part of HEP at this time   Standing hip 3-way x Discussed purpose for HEP   Side stepping  Red 5 laps   Monster walks forwards/backwards  Red 5 laps   Lifting techniques x 5', Discussed proper lifting techniques with box lifts. Patient demonstrated ~3-5 repetitions of understanding with good overall form noted     Plan for Next Visit:         PATIENT EDUCATION AND HOME EXERCISES     Home Exercises Provided and Patient Education Provided     Education provided:    Patient educated on biomechanical justification for therapeutic exercise and importance of compliance with HEP in order to improve overall impairments and QOL    Patient was educated on all the above exercise prior/during/after for proper posture, positioning, and execution for safe performance with home exercise program.    Patient educated on the importance of improved core and upper and lower extremity strength in order to improve alignment of the spine and upper and lower extremities with static positions and dynamic movement. "    Patient educated on the importance of strong core and lower extremity musculature in order to improve both static and dynamic balance, improve gait mechanics, reduce fall risk and improve household and community mobility.       Written Home Exercises Provided: Continue prior home exercise program  Exercises were reviewed and Haley was able to demonstrate them prior to the end of the session.  Haley demonstrated good  understanding of the education provided. See EMR under Patient Instructions for exercises provided during therapy sessions.      ASSESSMENT     Patient tolerated treatment well and has attended 6 total PT visits. She has made good overall progress with PT, demonstrating improvements in functional mobility, LE strength, core stabilization, and overall gait. Patient demonstrates improved form and god understanding of exercises. Her pain levels have decreased, and she has returned to more functional activities with less limitation. Patient demonstrated good understanding of proper lifting techniques to help when she will be moving to [place] this weekend. Educated patient to keep up with HEP consistently but to reach out and find a PT in that area if additional care is needed. She expressed understanding.     Haley is progressing well towards her goals.   Pt prognosis is Good.     Pt will continue to benefit from skilled outpatient physical therapy to address the deficits listed in the problem list box on initial evaluation, provide pt/family education and to maximize pt's level of independence in the home and community environment.     Pt's spiritual, cultural and educational needs considered and pt agreeable to plan of care and goals.     Anticipated Barriers for therapy: chronicity of condition       GOALS:     Short Term Goals:  6 weeks Progress    1. Pain: Pt will demonstrate improved pain by reports of less than or equal to 8/10 worst pain on the verbal rating scale in order to progress  toward maximal functional ability and improve QOL. Met   2. Function: Patient will demonstrate improved function as indicated by a functional limitation score of less than or equal to 32 out of 100 on FOTO. PC   3. Mobility: Patient will improve AROM to 50% of stated goals, listed in objective measures above, in order to progress towards independence with functional activities.  PC   4. Strength: Patient will improve strength to 50% of stated goals, listed in objective measures above, in order to progress towards independence with functional activities.  Met   5. Gait: Patient will demonstrate improved gait mechanics in order to improve functional mobility, improve quality of life, and decrease risk of further injury or fall.  Met   6. HEP: Patient will demonstrate independence with HEP in order to progress toward functional independence. Met   7. Improve postural awareness.  Met      Long Term Goals:  12 weeks Progress   1. Pain: Pt will demonstrate improved pain by reports of less than or equal to 6/10 worst pain on the verbal rating scale in order to progress toward maximal functional ability and improve QOL.   Met   2. Function: Patient will demonstrate improved function as indicated by a functional limitation score of less than or equal to 26 out of 100 on FOTO. PC   3. Mobility: Patient will improve AROM to stated goals, listed in objective measures above, in order to return to maximal functional potential and improve quality of life. PC   4. Strength: Patient will improve strength to stated goals, listed in objective measures above, in order to improve functional independence and quality of life. PM   5. Gait: Patient will demonstrate normalized gait mechanics with minimal compensation in order to return to PLOF. Met   6. Patient will return to normal ADL's, IADL's, community involvement, recreational activities, and work-related activities with less than or equal to 3/10 pain and maximal function.  PM          Goals Key:  PC= progressing/continue; PM= partially met;        DC= discontinue    PLAN     5/25/2022: Patient is considered DC from PT with HEP at this time.     4/27/2022 (evaluation): Outpatient Physical Therapy 2 times weekly for 12 weeks to include any combination of the following interventions: virtual visits, dry needling, modalities, electrical stimulation (IFC, Pre-Mod, Attended with Functional Dry Needling), Aquatic Therapy, Cervical/Lumbar Traction, Gait Training, Manual Therapy, Neuromuscular Re-ed, Patient Education, Self Care, Therapeutic Exercise and Therapeutic Activites       Angie Stewart, PT

## 2023-07-19 DIAGNOSIS — Z12.31 OTHER SCREENING MAMMOGRAM: ICD-10-CM

## 2023-12-16 NOTE — TELEPHONE ENCOUNTER
Attempted to contact patient regarding appointment change due to patient going to the wrong location. Phone call sent straight to voicemail. Left voicemail.    Patient/Caregiver provided printed discharge information.

## (undated) DEVICE — BAG TISSUE RETRIEVAL 5MM

## (undated) DEVICE — TRAY CATH FOL SIL URIMTR 16FR

## (undated) DEVICE — UNDERGLOVES BIOGEL PI SZ 6 LF

## (undated) DEVICE — ELECTRODE REM PLYHSV RETURN 9

## (undated) DEVICE — GLOVE SURGICAL LATEX SZ 7

## (undated) DEVICE — SUT VICRYL 4-0 27 PS-1 27IN

## (undated) DEVICE — COVER LIGHT HANDLE 80/CA

## (undated) DEVICE — TUBING COLLECTION PVC D AND C

## (undated) DEVICE — DRAPE LAVH LAPAROSCOPY W/FLUID

## (undated) DEVICE — CURRETTE VACCUUM CURVED 9MM

## (undated) DEVICE — SYR 3CC LUER LOC

## (undated) DEVICE — SEE MEDLINE ITEM 157027

## (undated) DEVICE — IRRIGATOR ENDOSCOPY DISP.

## (undated) DEVICE — SEE MEDLINE ITEM 146292

## (undated) DEVICE — DRESSING TELFA N ADH 3X8

## (undated) DEVICE — GLOVE SURGICAL LATEX SZ 6

## (undated) DEVICE — ADHESIVE DERMABOND ADVANCED

## (undated) DEVICE — TROCAR ENDOPATH XCEL 5X100MM

## (undated) DEVICE — CANNULA ENDOPATH XCEL 5X100MM

## (undated) DEVICE — SEE MEDLINE ITEM 154981

## (undated) DEVICE — SHEARS HARMONIC 5CM 36CM

## (undated) DEVICE — CONTAINER SPECIMEN OR STER 4OZ

## (undated) DEVICE — SOL NS 1000CC

## (undated) DEVICE — PACK DRAPE PERI/GYN TIBURON

## (undated) DEVICE — SEE MEDLINE ITEM 157117

## (undated) DEVICE — MANIFOLD 4 PORT

## (undated) DEVICE — UNDERGLOVES BIOGEL PI SZ 7 LF

## (undated) DEVICE — NDL PNEUMO INSUFFLATI 120MM

## (undated) DEVICE — KIT ANTIFOG W/SPONG & FLUID

## (undated) DEVICE — SEE MEDLINE ITEM 157181

## (undated) DEVICE — APPLICATOR CHLORAPREP ORN 26ML

## (undated) DEVICE — MANIPULATOR UTERINE